# Patient Record
Sex: MALE | Race: WHITE | NOT HISPANIC OR LATINO | Employment: UNEMPLOYED | URBAN - METROPOLITAN AREA
[De-identification: names, ages, dates, MRNs, and addresses within clinical notes are randomized per-mention and may not be internally consistent; named-entity substitution may affect disease eponyms.]

---

## 2017-01-23 ENCOUNTER — OFFICE VISIT (OUTPATIENT)
Dept: FAMILY MEDICINE | Facility: CLINIC | Age: 37
End: 2017-01-23
Payer: COMMERCIAL

## 2017-01-23 VITALS
BODY MASS INDEX: 31.73 KG/M2 | TEMPERATURE: 97 F | HEIGHT: 74 IN | DIASTOLIC BLOOD PRESSURE: 80 MMHG | HEART RATE: 83 BPM | WEIGHT: 247.25 LBS | OXYGEN SATURATION: 98 % | SYSTOLIC BLOOD PRESSURE: 118 MMHG

## 2017-01-23 DIAGNOSIS — R07.9 CHEST PAIN, UNSPECIFIED TYPE: ICD-10-CM

## 2017-01-23 DIAGNOSIS — M54.9 UPPER BACK PAIN: Primary | ICD-10-CM

## 2017-01-23 DIAGNOSIS — M51.24 THORACIC DISC HERNIATION: ICD-10-CM

## 2017-01-23 PROCEDURE — 1159F MED LIST DOCD IN RCRD: CPT | Mod: S$GLB,,, | Performed by: FAMILY MEDICINE

## 2017-01-23 PROCEDURE — 99213 OFFICE O/P EST LOW 20 MIN: CPT | Mod: S$GLB,,, | Performed by: FAMILY MEDICINE

## 2017-01-23 PROCEDURE — 99999 PR PBB SHADOW E&M-EST. PATIENT-LVL III: CPT | Mod: PBBFAC,,, | Performed by: FAMILY MEDICINE

## 2017-01-23 PROCEDURE — 93010 ELECTROCARDIOGRAM REPORT: CPT | Mod: S$GLB,,, | Performed by: INTERNAL MEDICINE

## 2017-01-23 PROCEDURE — 93005 ELECTROCARDIOGRAM TRACING: CPT | Mod: S$GLB,,, | Performed by: FAMILY MEDICINE

## 2017-01-23 PROCEDURE — 3074F SYST BP LT 130 MM HG: CPT | Mod: S$GLB,,, | Performed by: FAMILY MEDICINE

## 2017-01-23 PROCEDURE — 3079F DIAST BP 80-89 MM HG: CPT | Mod: S$GLB,,, | Performed by: FAMILY MEDICINE

## 2017-01-23 NOTE — MR AVS SNAPSHOT
[facility]  [address] [address] [address]  Phone: [phone]  Fax: [phone]                  Kofi Damian   2017 9:20 AM   Office Visit    Description:  Male : 1980   Provider:  Óscar Kelly MD   Department:  [facility]           Diagnoses this Visit        Comments    Upper back pain    -  Primary     Thoracic disc herniation         Chest pain, unspecified type                To Do List           Future Appointments        Provider Department Dept Phone    2017 1:15 PM Jesu Quiñonez MD [facility] Neurosurgery [phone]      Goals (5 Years of Data)     None      [facility] On Call     [facility] On Call Nurse Care Line -  Assistance  Registered nurses in the [facility] On Call Center provide clinical advisement, health education, appointment booking, and other advisory services.  Call for this free service at [phone].             Medications           Message regarding Medications     Verify the changes and/or additions to your medication regime listed below are the same as discussed with your clinician today.  If any of these changes or additions are incorrect, please notify your healthcare provider.             Verify that the below list of medications is an accurate representation of the medications you are currently taking.  If none reported, the list may be blank. If incorrect, please contact your healthcare provider. Carry this list with you in case of emergency.           Current Medications     acetaminophen (TYLENOL) 325 MG tablet Take 325 mg by mouth every 6 (six) hours as needed for Pain.    benazepril (LOTENSIN) 10 MG tablet Take 1 tablet (10 mg total) by mouth once daily.    cyclobenzaprine (FLEXERIL) 10 MG tablet Take 10 mg by mouth 3 (three) times daily as needed for Muscle spasms.    gabapentin (NEURONTIN) 300 MG capsule Take 1 po q day x 1 week then 1 po bid x 1 week then 1 po tid    ibuprofen (ADVIL,MOTRIN)  "200 MG tablet Take 200 mg by mouth every 6 (six) hours as needed for Pain.    tramadol (ULTRAM) 50 mg tablet Take 2 tablets (100 mg total) by mouth every 6 (six) hours as needed for Pain.           Clinical Reference Information           Vital Signs - Last Recorded  Most recent update: 1/23/2017 10:01 AM by Óscar Kelly MD    BP Pulse Temp Ht Wt SpO2    118/80 83 96.9 °F (36.1 °C) (Oral) 6' 2" (1.88 m) 112.1 kg (247 lb 3.9 oz) 98%    BMI                31.74 kg/m2          Blood Pressure          Most Recent Value    BP  118/80      Allergies as of 1/23/2017     Cymbalta [Duloxetine]    Demerol [Meperidine]      Immunizations Administered on Date of Encounter - 1/23/2017     None      Orders Placed During Today's Visit      Normal Orders This Visit    Ambulatory referral to Neurosurgery     Future Labs/Procedures Expected by Expires    EKG 12-LEAD - Muse  As directed 1/23/2018    Exercise stress echo  As directed 1/23/2018      "

## 2017-01-23 NOTE — PROGRESS NOTES
"Subjective:      Patient ID: Kofi Damian is a 36 y.o. male.    Chief Complaint: upper back pain    HPI  He has had continued pain in the back and he has had his chest pain and it has gone all of the way up to the "hendrickson apple" at the same time.  He states that his bp and pulse were normal when this was going on.  It came on when he was sitting watching TV.  He states that the pain starts in the middle of the back and then it radiates around.  It is bilaterally located.    He and I had planned on him going to a cardiologist on his last visit but he states that he has not done that as he had lost insurance and he just got it back.  He personally would also like to have a second opinion on the trouble on his back. He did see a neurosurgeon for this and he did not feel that he had a surgical problem.   He continues to have low back pain also and he would like a second opinion on that also.       Narrative      At the T7-8 level, there is a focal 4 mm left posterior paracentral disc extrusion causing no spinal cord impingement or foraminal compromise.    The other thoracic spine discs demonstrate no abnormalities with no evidence of significant degeneration protrusion, or extrusion.  No osseous spinal canal or foraminal stenosis are present at any level.  The spinal cord appears normal.  Vertebral body heights are well maintained and the vertebral bodies are well aligned.  Bone marrow signal is normal.  The paraspinal soft tissues are unremarkable.       Impression       T7-8 left posterior paracentral disc extrusion causing no spinal cord impingement or foraminal compromise.      Electronically signed by: Lino Whalen  Date: 10/26/16  Time: 15:59         Encounter      View Encounter            Narrative      Comparison: L-spine series 03/12/14    Usual sequences performed without contrast.    Findings:    Vertebral body height and alignment are well maintained without acute fracture or subluxation.  Marrow " signal is within normal limits.  Minimal disc desiccation most prominently involving the T12 -- L1 and L1 -- 2 levels.  Schmorl's nodes identified at   the L1 -- 2 level.  Small Tarlov cyst noted within the posterior lateral margin of the sacral canal at the S1 -- 2 level minimally displacing adjacent nerve roots.  Correlate clinically.  Conus terminates at the T12 level.    T12 -- L1: Desiccation without herniation, protrusion or extrusion.  Mild facet arthropathy.  No acquired stenosis.     L1 -- 2: Posterior degenerative disc ridging with minimal foraminal and extraforaminal prominence.  No herniation or protrusion.  No acquired stenosis.  Bilateral facet arthropathy and minimally hypertrophied posterior ligaments.    L2 -- 3: Posterior concentric degenerative disc bulge or ridging.  Flattened anterior margin thecal sac.  Bilateral facet arthropathy and hypertrophy posterior ligaments with mild narrowing inferior aspect neural foramina , greater on the left.  No   central stenosis.    L3 -- 4: Posterior concentric disc bulge with flattened anterior margin thecal sac.  Inferior foraminal narrowing bilaterally with bilateral facet arthropathy and hypertrophy posterior ligaments.  No central stenosis.  6 mm x 6 mm right perineural cyst.        L4-5: Broad-based posterior concentric disc bulge.  Effaced anterior thecal sac.  Mild to moderate narrowing inferior aspect neural foramina bilaterally.  No central stenosis.  Mild facet arthropathy and hypertrophy of posterior ligaments.    L5 -- S1: Posterior concentric disc bulge.  Bilateral facet arthropathy.  No acquired stenosis.       Impression            Multilevel degenerative disc and facet disease.  See detailed discussion above.    Additional findings as above.      Electronically signed by: KIMMIE LESLIE III, MD  Date: 06/26/14  Time: 18:49        The patient presents with essential hypertension.  The patient is tolerating the medication well and is in  "excellent compliance.    Visit Vitals    /81    Pulse 83    Temp 96.9 °F (36.1 °C) (Oral)    Ht 6' 2" (1.88 m)    Wt 112.1 kg (247 lb 3.9 oz)    SpO2 98%    BMI 31.74 kg/m2       Review of Systems    Objective:     Visit Vitals    /81    Pulse 83    Temp 96.9 °F (36.1 °C) (Oral)    Ht 6' 2" (1.88 m)    Wt 112.1 kg (247 lb 3.9 oz)    SpO2 98%    BMI 31.74 kg/m2       Physical Exam   Constitutional: He appears well-developed and well-nourished.   Musculoskeletal:        Back:        Assessment:     1. Upper back pain    2. Thoracic disc herniation    3. Chest pain, unspecified type        Plan:   Diagnoses and all orders for this visit:    Upper back pain  -     Ambulatory referral to Neurosurgery    Thoracic disc herniation  -     Ambulatory referral to Neurosurgery    Chest pain, unspecified type  -     Exercise stress echo; Future  -     EKG 12-LEAD - Muse; Future      I am referring for a second opinion on the back pain that is constant but intermittently causes pain that radiates to his chest.  I am concerned that he is having intermittent impingement causing him some symptoms in his back and chest.  Due to his family history of early CAD, I am going to do the stress ECHO.  "

## 2017-01-26 ENCOUNTER — CLINICAL SUPPORT (OUTPATIENT)
Dept: CARDIOLOGY | Facility: CLINIC | Age: 37
End: 2017-01-26
Payer: COMMERCIAL

## 2017-01-26 DIAGNOSIS — R07.9 CHEST PAIN, UNSPECIFIED TYPE: ICD-10-CM

## 2017-01-26 LAB
DIASTOLIC DYSFUNCTION: NO
ESTIMATED PA SYSTOLIC PRESSURE: 21.66
MITRAL VALVE MOBILITY: NORMAL
RETIRED EF AND QEF - SEE NOTES: 55 (ref 55–65)

## 2017-01-26 PROCEDURE — 93321 DOPPLER ECHO F-UP/LMTD STD: CPT | Mod: S$GLB,,, | Performed by: INTERNAL MEDICINE

## 2017-01-26 PROCEDURE — 93351 STRESS TTE COMPLETE: CPT | Mod: S$GLB,,, | Performed by: INTERNAL MEDICINE

## 2017-04-12 ENCOUNTER — INITIAL CONSULT (OUTPATIENT)
Dept: NEUROSURGERY | Facility: CLINIC | Age: 37
End: 2017-04-12
Payer: COMMERCIAL

## 2017-04-12 VITALS
DIASTOLIC BLOOD PRESSURE: 83 MMHG | HEIGHT: 74 IN | SYSTOLIC BLOOD PRESSURE: 132 MMHG | HEART RATE: 81 BPM | WEIGHT: 237.44 LBS | BODY MASS INDEX: 30.47 KG/M2

## 2017-04-12 DIAGNOSIS — M51.24 THORACIC DISC HERNIATION: Primary | ICD-10-CM

## 2017-04-12 PROCEDURE — 99999 PR PBB SHADOW E&M-EST. PATIENT-LVL III: CPT | Mod: PBBFAC,,, | Performed by: NEUROLOGICAL SURGERY

## 2017-04-12 PROCEDURE — 99214 OFFICE O/P EST MOD 30 MIN: CPT | Mod: S$GLB,,, | Performed by: NEUROLOGICAL SURGERY

## 2017-04-12 PROCEDURE — 3079F DIAST BP 80-89 MM HG: CPT | Mod: S$GLB,,, | Performed by: NEUROLOGICAL SURGERY

## 2017-04-12 PROCEDURE — 3075F SYST BP GE 130 - 139MM HG: CPT | Mod: S$GLB,,, | Performed by: NEUROLOGICAL SURGERY

## 2017-04-12 PROCEDURE — 1160F RVW MEDS BY RX/DR IN RCRD: CPT | Mod: S$GLB,,, | Performed by: NEUROLOGICAL SURGERY

## 2017-04-12 NOTE — LETTER
April 24, 2017      Óscar Kelly MD  [address] [address] [address]           [facility]  [address]  [facility] [address]  Phone: [phone]  Fax: [phone]          Patient: Kofi Damian   MR Number: [id number]   YOB: 1980   Date of Visit: 4/12/2017       Dear Dr. Óscar Kelly:    Thank you for referring Kofi Damian to me for evaluation. Attached you will find relevant portions of my assessment and plan of care.    If you have questions, please do not hesitate to call me. I look forward to following Kofi Damian along with you.    Sincerely,    Jesu Quiñonez MD    Enclosure  CC:  No Recipients    If you would like to receive this communication electronically, please contact [email or web address] or [phone] to request more information on "[employer] Link access.    For providers and/or their staff who would like to refer a patient to [facility], please contact us through our one-stop-shop provider referral line, [facility] , at [phone].    If you feel you have received this communication in error or would no longer like to receive these types of communications, please e-mail [email or web address]

## 2017-04-12 NOTE — PROGRESS NOTES
Subjective:    I, Lyndon Ruiz, am scribing for, and in the presence of, Dr. Quiñonez.     Patient ID: Kofi Damian is a 36 y.o. male.    Chief Complaint: Consult and Lumbar Spine Pain (L-Spine)    HPI   Pt is a 36 y.o. male is here to see me for another opinion after being seen by Dr. Garrett. Pt presents complaining of thoracic back pain which has progressively worsened over the past several years. He has thoracic back pain some associated left-sided chest pain and sternal pain. He denies any radiation around the ribs on the left. He notes cardiac workup so far including stress test and echo which have been negative. He says gabapentin has not been providing any significant relief for his back pain. He says he has been unable to return to work since 2014, previously a , because he has been unable to remain standing for longer than 5 minutes due to pain. He says that he does have Tramadol but only takes this PRN.    Review of Systems   Constitutional: Negative for chills, diaphoresis, fatigue and fever.   HENT: Negative for congestion, rhinorrhea, sinus pressure, sneezing, sore throat and trouble swallowing.    Eyes: Negative.  Negative for visual disturbance.   Respiratory: Negative for cough, choking, chest tightness and shortness of breath.    Cardiovascular: Negative for chest pain.   Gastrointestinal: Negative for abdominal pain, diarrhea, nausea and vomiting.   Endocrine: Negative.    Genitourinary: Negative for dysuria.   Musculoskeletal: Positive for back pain (thoracic back pain, radiates through to left chest).   Skin: Negative for color change, pallor, rash and wound.   Neurological: Negative for syncope.   Hematological: Does not bruise/bleed easily.   Psychiatric/Behavioral: Negative for confusion.       Objective:      Physical Exam:    Constitutional: He appears well-developed.     Eyes: Pupils are equal, round, and reactive to light. Conjunctivae and EOM are normal.     Cardiovascular:  Normal rate, regular rhythm, normal pulses and intact distal pulses.     Abdominal: Soft.     Psych/Behavior: He is alert. He is oriented to person, place, and time. He has a normal mood and affect.     Musculoskeletal: Gait is normal.        Neck: Range of motion is full. There is no tenderness. Muscle strength is 5/5. Tone is normal.        Back: Range of motion is full. There is no tenderness. Muscle strength is 5/5. Tone is normal.        Right Upper Extremities: Range of motion is full. There is no tenderness. Muscle strength is 5/5. Tone is normal.        Left Upper Extremities: Range of motion is full. There is no tenderness. Muscle strength is 5/5. Tone is normal.       Right Lower Extremities: Range of motion is full. There is no tenderness. Muscle strength is 5/5. Tone is normal.        Left Lower Extremities: Range of motion is full. There is no tenderness. Muscle strength is 5/5. Tone is normal.     Neurological:        Coordination: He has a normal Romberg Test, normal finger to nose coordination, normal heel to shin coordination and normal tandem walking coordination.        Sensory: There is no sensory deficit in the trunk. There is no sensory deficit in the extremities.        DTRs: DTRs are normal. Tricep reflexes are 2+ on the right side and 2+ on the left side. Bicep reflexes are 2+ on the right side and 2+ on the left side. Brachioradialis reflexes are 2+ on the right side and 2+ on the left side. Patellar reflexes are 2+ on the right side and 2+ on the left side. Achilles reflexes are 2+ on the right side and 2+ on the left side. He displays no Babinski's sign on the right side. He displays no Babinski's sign on the left side.        Cranial nerves: Cranial nerve(s) II, III, IV, V, VI, VII, VIII, IX, X, XI and XII are intact.       Pt has midthoracic back pain with some associated left sided chest and sternal pan.   But he actually denies radiation around the ribs.   He is full strength.   No  myelopathy at this stage.   No bowel/bladder issues.   Relatively normal gait.   He has focal tenderness around the upper mid thoracic area.     Imaging:   MRI T-spine, dated 10/26/2016. He appears to have a left-sided T7-T8 paracentral disc herniation without compression on the thecal sac, but there is definitely compression on the nerve root. I don't see any central stenosis     MRI L-spine, dated June 2014, which was really not very impressive except for possible left-sided S2 or S3 perineural cyst.     Assessment/Plan:   36 y.o. male with left paracentral T7-T8 disc protrusion. There is mild canal compromise but definite nerve root impingement. He has had extensive cardiac workup for chest issues and chest wall issues that has been negative, so I believe this my be related somehow. It may just be an atypical C7 radiculopathy. I would like to try a transforaminal selective nerve root block on the left side. Since the last scan was about 6 months ago, I would like to get updated MRI scan now and CT scan to find out if it he has calcified or soft disc.     I, Dr. Quiñonez, personally performed the services described in this documentation as scribed by Lyndon Ruiz in my presence, and it is both accurate and complete.

## 2017-05-15 ENCOUNTER — PATIENT MESSAGE (OUTPATIENT)
Dept: NEUROSURGERY | Facility: CLINIC | Age: 37
End: 2017-05-15

## 2017-05-31 ENCOUNTER — TELEPHONE (OUTPATIENT)
Dept: NEUROSURGERY | Facility: CLINIC | Age: 37
End: 2017-05-31

## 2017-05-31 ENCOUNTER — TELEPHONE (OUTPATIENT)
Dept: INTERVENTIONAL RADIOLOGY/VASCULAR | Facility: HOSPITAL | Age: 37
End: 2017-05-31

## 2017-05-31 NOTE — TELEPHONE ENCOUNTER
---- Message -----  From: Kirti Nathan  Sent: 5/31/2017   3:16 PM  To: Moraima Pedraza MA  Subject: RE: Insurance will not cover                     Yes the previous note was an old note.    ----- Message -----  From: Moraima Pedraza MA  Sent: 5/30/2017   5:16 PM  To: Kirti Nathan  Subject: RE: Insurance will not cover                     Injection is showing approved is that correct?    Thanks        ----- Message -----  From: Kirti Nathan  Sent: 5/23/2017   9:27 AM  To: [facility]  Subject: Insurance will not cover                         Please be advised this procedure is not a covered benefit for patient. The insurance company will not pay for this procedure. Case is denied.    Kirti Nathan  Ext [phone]

## 2017-06-01 ENCOUNTER — HOSPITAL ENCOUNTER (OUTPATIENT)
Dept: INTERVENTIONAL RADIOLOGY/VASCULAR | Facility: HOSPITAL | Age: 37
Discharge: HOME | End: 2017-06-01
Attending: NEUROLOGICAL SURGERY
Payer: COMMERCIAL

## 2017-06-01 VITALS
DIASTOLIC BLOOD PRESSURE: 84 MMHG | HEART RATE: 72 BPM | OXYGEN SATURATION: 99 % | RESPIRATION RATE: 18 BRPM | SYSTOLIC BLOOD PRESSURE: 132 MMHG

## 2017-06-01 DIAGNOSIS — M51.24 THORACIC DISC HERNIATION: ICD-10-CM

## 2017-06-01 PROCEDURE — 64479 NJX AA&/STRD TFRM EPI C/T 1: CPT | Mod: ,,, | Performed by: RADIOLOGY

## 2017-06-01 PROCEDURE — 64480 NJX AA&/STRD TFRM EPI C/T EA: CPT | Mod: ,,, | Performed by: RADIOLOGY

## 2017-06-01 PROCEDURE — 64480 NJX AA&/STRD TFRM EPI C/T EA: CPT

## 2017-06-01 PROCEDURE — 63600175 PHARM REV CODE 636 W HCPCS: Performed by: NEUROLOGICAL SURGERY

## 2017-06-01 PROCEDURE — 64462 PVB THORACIC 2ND+ INJ SITE: CPT

## 2017-06-01 PROCEDURE — 25500020 PHARM REV CODE 255: Performed by: NEUROLOGICAL SURGERY

## 2017-06-01 PROCEDURE — 64461 PVB THORACIC SINGLE INJ SITE: CPT

## 2017-06-01 RX ORDER — METHYLPREDNISOLONE ACETATE 40 MG/ML
40 INJECTION, SUSPENSION INTRA-ARTICULAR; INTRALESIONAL; INTRAMUSCULAR; SOFT TISSUE ONCE
Status: COMPLETED | OUTPATIENT
Start: 2017-06-01 | End: 2017-06-01

## 2017-06-01 RX ADMIN — METHYLPREDNISOLONE ACETATE 40 MG: 40 INJECTION, SUSPENSION INTRA-ARTICULAR; INTRALESIONAL; INTRAMUSCULAR; SOFT TISSUE at 10:06

## 2017-06-01 RX ADMIN — METHYLPREDNISOLONE ACETATE 40 MG: 40 INJECTION, SUSPENSION INTRA-ARTICULAR; INTRALESIONAL; INTRAMUSCULAR; SOFT TISSUE at 11:06

## 2017-06-01 RX ADMIN — IOHEXOL 3 ML: 180 INJECTION INTRAVENOUS at 11:06

## 2017-06-01 NOTE — H&P
Radiology History & Physical      SUBJECTIVE:     Chief Complaint: back pain    History of Present Illness:  Kofi Damian is a 36 y.o. male who presents for nerve root blocks at T6, T7, and T8 on the left.    Past Medical History:   Diagnosis Date    Back pain     Facet arthropathy, lumbosacral     Family history of early CAD 8/10/2016    His father  at the age of 48 from an MI    L4-L5 disc bulge      Past Surgical History:   Procedure Laterality Date    COLONOSCOPY N/A 2016    Procedure: COLONOSCOPY;  Surgeon: Óscar Kelly MD;  Location: [facility];  Service: Endoscopy;  Laterality: N/A;    LIPOMA RESECTION      WRIST SURGERY         Home Meds:   Prior to Admission medications    Medication Sig Start Date End Date Taking? Authorizing Provider   acetaminophen (TYLENOL) 325 MG tablet Take 325 mg by mouth every 6 (six) hours as needed for Pain.    Historical Provider, MD   benazepril (LOTENSIN) 10 MG tablet Take 1 tablet (10 mg total) by mouth once daily. 16   Óscar Kelly MD   cyclobenzaprine (FLEXERIL) 10 MG tablet Take 10 mg by mouth 3 (three) times daily as needed for Muscle spasms.    Historical Provider, MD   gabapentin (NEURONTIN) 300 MG capsule Take 1 po q day x 1 week then 1 po bid x 1 week then 1 po tid 16   Óscar Kelly MD   ibuprofen (ADVIL,MOTRIN) 200 MG tablet Take 200 mg by mouth every 6 (six) hours as needed for Pain.    Historical Provider, MD   tramadol (ULTRAM) 50 mg tablet Take 2 tablets (100 mg total) by mouth every 6 (six) hours as needed for Pain. 10/11/16   Óscar Kelly MD     Anticoagulants/Antiplatelets: no anticoagulation    Allergies:   Review of patient's allergies indicates:   Allergen Reactions    Cymbalta [duloxetine]      Made him shake, not sleep and have suicidal thoughts.    Demerol [meperidine]      Sedation History:  no adverse reactions    Review of Systems:   Hematological: no known coagulopathies  Respiratory: no shortness of  breath  Cardiovascular: no chest pain  Gastrointestinal: no abdominal pain  Genito-Urinary: no dysuria  Musculoskeletal: back pain  Neurological: no TIA or stroke symptoms         OBJECTIVE:     Vital Signs (Most Recent)  Pulse: 72 (06/01/17 1017)  Resp: 18 (06/01/17 1017)  BP: 132/84 (06/01/17 1017)  SpO2: 99 % (06/01/17 1017)    Physical Exam:  ASA: 3  Mallampati: 2    General: no acute distress  Mental Status: alert and oriented to person, place and time  HEENT: normocephalic, atraumatic  Chest: unlabored breathing  Heart: regular heart rate  Abdomen: nondistended  Extremity: moves all extremities    Laboratory  Lab Results   Component Value Date    INR 1.0 10/07/2016       Lab Results   Component Value Date    WBC 12.55 10/07/2016    HGB 16.3 10/07/2016    HCT 48.1 10/07/2016    MCV 88 10/07/2016     10/07/2016      Lab Results   Component Value Date    GLU 93 10/17/2016     10/17/2016    K 4.1 10/17/2016     10/17/2016    CO2 20 (L) 10/17/2016    BUN 15 10/17/2016    CREATININE 0.9 10/17/2016    CALCIUM 9.7 10/17/2016    ALT 47 (H) 10/17/2016    AST 21 10/17/2016    ALBUMIN 4.2 10/17/2016    BILITOT 0.7 10/17/2016       ASSESSMENT/PLAN:     Sedation Plan: local to moderate, PRN  Patient will undergo left T6, T7, and T8 nerve root blocks.    Ricky Aj MD  Department of Radiology  [phone]

## 2017-06-01 NOTE — PROCEDURES
Radiology Post-Procedure Note    Pre Op Diagnosis: back pain    Post Op Diagnosis: Same    Procedure: Thoracic nerve root blocks    Procedure performed by: Dr. Wooten, Dr. Aj    Written Informed Consent Obtained: Yes    Specimen Removed: NO    Estimated Blood Loss: Minimal    Levels injected: left T6, T7, and T8 nerve root blocks  Needle used: 22 gauge  Omnipaque injections confirmed location  Dose to each level:    40 mg Depo-methylprednisolone   1.5 mL Lidocaine 1% MPF    Patient tolerated procedure well.    Ricky Aj MD  Department of Radiology  [phone]

## 2017-06-01 NOTE — PROGRESS NOTES
Pt arrived to IR room 188 for nerve root block, no acute distress noted. Orders and labs reviewed on chart. Awaiting consent.

## 2017-06-01 NOTE — DISCHARGE INSTRUCTIONS
[facility] [phone] (MON-FRI 8 AM- 5PM). Radiology Resident on call [phone].

## 2017-06-01 NOTE — PROGRESS NOTES
Nerve root blocks at T6, T7, and T8 on the left completed, pt tolerated well. No apparent distress noted. Band aid applied CDI. Discharge instructions reviewed and acknowledged. Pt discharged via wheelchair and private vehicle, accompanied by family.

## 2017-06-15 ENCOUNTER — TELEPHONE (OUTPATIENT)
Dept: NEUROSURGERY | Facility: CLINIC | Age: 37
End: 2017-06-15

## 2017-06-15 NOTE — TELEPHONE ENCOUNTER
The pt stated that his insurance has not yet approved his MRI and Ct request from  therefore the pt would rather reschedule these appointments until given approval from insurance.  I verbalized understanding and will also reschedule appointment.  I also informed the pt that a peer to peer has been set for 6/20/17 for possible approval.

## 2017-06-15 NOTE — TELEPHONE ENCOUNTER
----- Message from Keyanna Ann sent at 6/15/2017  2:52 PM CDT -----  Contact: pt [phone]  Pt is calling to speak with nurse regarding his appts on 6/20,pt states he is having some issues with his insurance company.pls call

## 2017-07-05 ENCOUNTER — TELEPHONE (OUTPATIENT)
Dept: SPINE | Facility: CLINIC | Age: 37
End: 2017-07-05

## 2017-07-05 NOTE — TELEPHONE ENCOUNTER
----- Message from Gustavo Sykes sent at 7/5/2017  4:06 PM CDT -----  Contact: PT  Would like Moraima to call him regarding MRI and CT. Would like to know if it has been approved by his insurance.     Please call patient as soon as possible.     Call: [phone]

## 2017-07-07 ENCOUNTER — TELEPHONE (OUTPATIENT)
Dept: NEUROSURGERY | Facility: CLINIC | Age: 37
End: 2017-07-07

## 2017-07-07 NOTE — TELEPHONE ENCOUNTER
----- Message from Gustavo Sykes sent at 7/5/2017  4:06 PM CDT -----  Contact: PT  Would like Moraima to call him regarding MRI and CT. Would like to know if it has been approved by his insurance.     Please call patient as soon as possible.     Call: [phone]

## 2017-07-11 ENCOUNTER — OFFICE VISIT (OUTPATIENT)
Dept: NEUROSURGERY | Facility: CLINIC | Age: 37
End: 2017-07-11
Payer: COMMERCIAL

## 2017-07-11 ENCOUNTER — HOSPITAL ENCOUNTER (OUTPATIENT)
Dept: RADIOLOGY | Facility: HOSPITAL | Age: 37
Discharge: HOME | End: 2017-07-11
Attending: NEUROLOGICAL SURGERY
Payer: COMMERCIAL

## 2017-07-11 VITALS
DIASTOLIC BLOOD PRESSURE: 77 MMHG | HEART RATE: 83 BPM | HEIGHT: 74 IN | SYSTOLIC BLOOD PRESSURE: 124 MMHG | WEIGHT: 231.5 LBS | TEMPERATURE: 98 F | BODY MASS INDEX: 29.71 KG/M2

## 2017-07-11 DIAGNOSIS — M51.24 HERNIATED THORACIC DISC WITHOUT MYELOPATHY: Primary | ICD-10-CM

## 2017-07-11 DIAGNOSIS — M51.24 THORACIC DISC HERNIATION: ICD-10-CM

## 2017-07-11 PROCEDURE — 99214 OFFICE O/P EST MOD 30 MIN: CPT | Mod: S$GLB,,, | Performed by: NEUROLOGICAL SURGERY

## 2017-07-11 PROCEDURE — 72146 MRI CHEST SPINE W/O DYE: CPT | Mod: TC

## 2017-07-11 PROCEDURE — 99999 PR PBB SHADOW E&M-EST. PATIENT-LVL III: CPT | Mod: PBBFAC,,, | Performed by: NEUROLOGICAL SURGERY

## 2017-07-11 PROCEDURE — 72146 MRI CHEST SPINE W/O DYE: CPT | Mod: 26,,, | Performed by: RADIOLOGY

## 2017-07-11 NOTE — LETTER
July 18, 2017      Óscar Kelly MD  [address] [address] [address]           [facility] - Neurosurgery 7th Fl  [address]  [facility] [address]  Phone: [phone]          Patient: Kofi Damian   MR Number: [id number]   YOB: 1980   Date of Visit: 7/11/2017       Dear Dr. Óscar Kelly:    Thank you for referring Kofi Damian to me for evaluation. Attached you will find relevant portions of my assessment and plan of care.    If you have questions, please do not hesitate to call me. I look forward to following Kofi Damian along with you.    Sincerely,    Kel Blackman  CC:  No Recipients    If you would like to receive this communication electronically, please contact [email or web address] or [phone] to request more information on [employer] access.    For providers and/or their staff who would like to refer a patient to [facility], please contact us through our one-stop-shop provider referral line, [facility], at [phone].    If you feel you have received this communication in error or would no longer like to receive these types of communications, please e-mail [email or web address]

## 2017-07-11 NOTE — PROGRESS NOTES
"Subjective:    I, Lyndon Ruiz, am scribing for, and in the presence of, Dr. Quiñonez     Patient ID: Kofi Damian is a 36 y.o. male.    Chief Complaint: Follow-up    HPI   Pt is a 36 y.o. male who presents for follow up after last evaluation on 4/12/2017. Pt has a left paracentral T7-T8 disc protrusion and thoracic radiculopathy. At our last visit, we weren't quite sure what to make of it. He is here for follow up with a new MRI scan. Pt continues to complain of mid-thoracic back pain that feels like "someone stabbing me in the back and with the knife coming out of my chest" at the sternum area. He still complains of paraspinal back pain greater on the right than left. He denies any rib pain. He states that transforaminal selective nerve root block injections done in June were not helpful. He denies any leg weakness or bowel/bladder issues.      Review of Systems   Constitutional: Negative for chills, diaphoresis, fatigue and fever.   HENT: Negative for congestion, rhinorrhea, sinus pressure, sneezing, sore throat and trouble swallowing.    Eyes: Negative.  Negative for visual disturbance.   Respiratory: Negative for cough, choking, chest tightness and shortness of breath.    Cardiovascular: Negative for chest pain.   Gastrointestinal: Negative for abdominal pain, diarrhea, nausea and vomiting.   Endocrine: Negative.    Genitourinary: Negative for dysuria.   Musculoskeletal: Positive for back pain (mid thoracic back pain radiates through to the sternum. paraspinal thoracic back pain greater on right than left.).   Skin: Negative for color change, pallor, rash and wound.   Neurological: Negative for syncope.   Hematological: Does not bruise/bleed easily.   Psychiatric/Behavioral: Negative for confusion.       Objective:      Physical Exam:    Constitutional: He appears well-developed.     Eyes: Pupils are equal, round, and reactive to light. Conjunctivae and EOM are normal.     Cardiovascular: Normal rate, regular " rhythm, normal pulses and intact distal pulses.     Abdominal: Soft.     Psych/Behavior: He is alert. He is oriented to person, place, and time. He has a normal mood and affect.     Musculoskeletal: Gait is normal.        Neck: Range of motion is full. There is no tenderness. Muscle strength is 5/5. Tone is normal.        Back: Range of motion is full. There is no tenderness. Muscle strength is 5/5. Tone is normal.        Right Upper Extremities: Range of motion is full. There is no tenderness. Muscle strength is 5/5. Tone is normal.        Left Upper Extremities: Range of motion is full. There is no tenderness. Muscle strength is 5/5. Tone is normal.       Right Lower Extremities: Range of motion is full. There is no tenderness. Muscle strength is 5/5. Tone is normal.        Left Lower Extremities: Range of motion is full. There is no tenderness. Muscle strength is 5/5. Tone is normal.     Neurological:        Coordination: He has a normal Romberg Test, normal finger to nose coordination, normal heel to shin coordination and normal tandem walking coordination.        Sensory: There is no sensory deficit in the trunk. There is no sensory deficit in the extremities.        DTRs: DTRs are normal. Tricep reflexes are 2+ on the right side and 2+ on the left side. Bicep reflexes are 2+ on the right side and 2+ on the left side. Brachioradialis reflexes are 2+ on the right side and 2+ on the left side. Patellar reflexes are 2+ on the right side and 2+ on the left side. Achilles reflexes are 2+ on the right side and 2+ on the left side. He displays no Babinski's sign on the right side. He displays no Babinski's sign on the left side.        Cranial nerves: Cranial nerve(s) II, III, IV, V, VI, VII, VIII, IX, X, XI and XII are intact.       Pt still has significant amount of mid thoracic back pain but it is right paraspinal. He still denies any true radiating pain around the ribs, but has pain anteriorly in the sternum area.  He states that it is best described as someone stabbing him in the back with a knife coming out from his chest.    No real signs of myelopathy.   No real signs of bowel or bladder dysfunction or weakness in the legs.      Imaging:   MRI T-spine, dated 07/11/2017, shows pt has stable thoracic disc at T7-T8.     Assessment/Plan:   Pt with a left T7-T8 thoracic disc and possible atypical thoracic radiculopathy. No evidence of myelopathy. I think before jumping to thoracic discectomy, I would like to get another opinion from one of my spine colleagues.     I, Dr. Quiñonez, personally performed the services described in this documentation as scribed by Lyndon Ruiz in my presence, and it is both accurate and complete.

## 2017-12-20 ENCOUNTER — LAB VISIT (OUTPATIENT)
Dept: LAB | Facility: HOSPITAL | Age: 37
End: 2017-12-20
Attending: FAMILY MEDICINE
Payer: COMMERCIAL

## 2017-12-20 ENCOUNTER — OFFICE VISIT (OUTPATIENT)
Dept: FAMILY MEDICINE | Facility: CLINIC | Age: 37
End: 2017-12-20
Payer: COMMERCIAL

## 2017-12-20 VITALS
HEIGHT: 74 IN | HEART RATE: 66 BPM | TEMPERATURE: 98 F | BODY MASS INDEX: 28.75 KG/M2 | WEIGHT: 224 LBS | DIASTOLIC BLOOD PRESSURE: 77 MMHG | SYSTOLIC BLOOD PRESSURE: 117 MMHG

## 2017-12-20 DIAGNOSIS — I10 ESSENTIAL HYPERTENSION: Primary | ICD-10-CM

## 2017-12-20 DIAGNOSIS — M51.24 THORACIC DISC HERNIATION: ICD-10-CM

## 2017-12-20 DIAGNOSIS — K75.81 NASH (NONALCOHOLIC STEATOHEPATITIS): ICD-10-CM

## 2017-12-20 DIAGNOSIS — E78.1 HYPERTRIGLYCERIDEMIA: ICD-10-CM

## 2017-12-20 DIAGNOSIS — I10 ESSENTIAL HYPERTENSION: ICD-10-CM

## 2017-12-20 DIAGNOSIS — G89.29 CHRONIC MIDLINE THORACIC BACK PAIN: ICD-10-CM

## 2017-12-20 DIAGNOSIS — M54.6 CHRONIC MIDLINE THORACIC BACK PAIN: ICD-10-CM

## 2017-12-20 DIAGNOSIS — R21 RASH: ICD-10-CM

## 2017-12-20 LAB
ALBUMIN SERPL BCP-MCNC: 4.1 G/DL
ALP SERPL-CCNC: 69 U/L
ALT SERPL W/O P-5'-P-CCNC: 32 U/L
ANION GAP SERPL CALC-SCNC: 9 MMOL/L
AST SERPL-CCNC: 16 U/L
BASOPHILS # BLD AUTO: 0.06 K/UL
BASOPHILS NFR BLD: 0.7 %
BILIRUB SERPL-MCNC: 0.8 MG/DL
BUN SERPL-MCNC: 11 MG/DL
CALCIUM SERPL-MCNC: 9.9 MG/DL
CHLORIDE SERPL-SCNC: 105 MMOL/L
CHOLEST SERPL-MCNC: 135 MG/DL
CHOLEST/HDLC SERPL: 4.5 {RATIO}
CO2 SERPL-SCNC: 26 MMOL/L
CREAT SERPL-MCNC: 1 MG/DL
DIFFERENTIAL METHOD: ABNORMAL
EOSINOPHIL # BLD AUTO: 0.5 K/UL
EOSINOPHIL NFR BLD: 5.2 %
ERYTHROCYTE [DISTWIDTH] IN BLOOD BY AUTOMATED COUNT: 12.2 %
EST. GFR  (AFRICAN AMERICAN): >60 ML/MIN/1.73 M^2
EST. GFR  (NON AFRICAN AMERICAN): >60 ML/MIN/1.73 M^2
GLUCOSE SERPL-MCNC: 96 MG/DL
HCT VFR BLD AUTO: 48.1 %
HDLC SERPL-MCNC: 30 MG/DL
HDLC SERPL: 22.2 %
HGB BLD-MCNC: 15.1 G/DL
IMM GRANULOCYTES # BLD AUTO: 0.03 K/UL
IMM GRANULOCYTES NFR BLD AUTO: 0.3 %
LDLC SERPL CALC-MCNC: 93.4 MG/DL
LYMPHOCYTES # BLD AUTO: 2.5 K/UL
LYMPHOCYTES NFR BLD: 27.5 %
MCH RBC QN AUTO: 27.6 PG
MCHC RBC AUTO-ENTMCNC: 31.4 G/DL
MCV RBC AUTO: 88 FL
MONOCYTES # BLD AUTO: 0.6 K/UL
MONOCYTES NFR BLD: 6.9 %
NEUTROPHILS # BLD AUTO: 5.4 K/UL
NEUTROPHILS NFR BLD: 59.4 %
NONHDLC SERPL-MCNC: 105 MG/DL
NRBC BLD-RTO: 0 /100 WBC
PLATELET # BLD AUTO: 278 K/UL
PMV BLD AUTO: 11.7 FL
POTASSIUM SERPL-SCNC: 5.3 MMOL/L
PROT SERPL-MCNC: 7.4 G/DL
RBC # BLD AUTO: 5.47 M/UL
SODIUM SERPL-SCNC: 140 MMOL/L
TRIGL SERPL-MCNC: 58 MG/DL
WBC # BLD AUTO: 9.05 K/UL

## 2017-12-20 PROCEDURE — 90715 TDAP VACCINE 7 YRS/> IM: CPT | Mod: S$GLB,,, | Performed by: FAMILY MEDICINE

## 2017-12-20 PROCEDURE — 36415 COLL VENOUS BLD VENIPUNCTURE: CPT | Mod: PO

## 2017-12-20 PROCEDURE — 99395 PREV VISIT EST AGE 18-39: CPT | Mod: 25,S$GLB,, | Performed by: FAMILY MEDICINE

## 2017-12-20 PROCEDURE — 85025 COMPLETE CBC W/AUTO DIFF WBC: CPT

## 2017-12-20 PROCEDURE — 80061 LIPID PANEL: CPT

## 2017-12-20 PROCEDURE — 80053 COMPREHEN METABOLIC PANEL: CPT

## 2017-12-20 PROCEDURE — 90471 IMMUNIZATION ADMIN: CPT | Mod: S$GLB,,, | Performed by: FAMILY MEDICINE

## 2017-12-20 PROCEDURE — 99999 PR PBB SHADOW E&M-EST. PATIENT-LVL IV: CPT | Mod: PBBFAC,,, | Performed by: FAMILY MEDICINE

## 2017-12-20 PROCEDURE — 90686 IIV4 VACC NO PRSV 0.5 ML IM: CPT | Mod: S$GLB,,, | Performed by: FAMILY MEDICINE

## 2017-12-20 PROCEDURE — 90472 IMMUNIZATION ADMIN EACH ADD: CPT | Mod: S$GLB,,, | Performed by: FAMILY MEDICINE

## 2017-12-20 RX ORDER — GABAPENTIN 300 MG/1
CAPSULE ORAL
Qty: 270 CAPSULE | Refills: 3 | Status: SHIPPED | OUTPATIENT
Start: 2017-12-20 | End: 2018-12-12

## 2017-12-20 RX ORDER — BENAZEPRIL HYDROCHLORIDE 10 MG/1
10 TABLET ORAL DAILY
Qty: 90 TABLET | Refills: 3 | Status: SHIPPED | OUTPATIENT
Start: 2017-12-20 | End: 2018-11-26 | Stop reason: SDUPTHER

## 2017-12-20 NOTE — PROGRESS NOTES
"Subjective:      Patient ID: Kofi Damian is a 37 y.o. male.    Chief Complaint: Annual Exam    HPI   Problem List Items Addressed This Visit     Essential hypertension    Overview     The patient presents with essential hypertension.  The patient is tolerating the medication well and is in excellent compliance.  The patient is experiencing no side effects.  Counseling was offered regarding low salt diets.  The patient has a reduced salt intake.  The patient denies chest pain, palpitations, shortness of breath, dyspnea on exertion, left or murmur neck pain, nausea, vomiting, diaphoresis, paroxysmal nocturnal dyspnea, and orthopnea.   /77   Pulse 66   Temp 98.2 °F (36.8 °C) (Oral)   Ht 6' 2" (1.88 m)   Wt 101.6 kg (223 lb 15.8 oz)   BMI 28.76 kg/m²   He is on lotensin for this now.         Current Assessment & Plan     The current medical regimen is effective;  continue present plan and medications.           Hypertriglyceridemia    Overview     The patient presents with hyperlipidemia.    He is not on medicine for this now.  Lab Results   Component Value Date    CHOL 203 (H) 08/10/2016       Lab Results   Component Value Date    HDL 33 (L) 08/10/2016       Lab Results   Component Value Date    LDLCALC 132.0 08/10/2016       Lab Results   Component Value Date    TRIG 190 (H) 08/10/2016       Lab Results   Component Value Date    CHOLHDL 16.3 (L) 08/10/2016     Lab Results   Component Value Date    ALT 47 (H) 10/17/2016    AST 21 10/17/2016    ALKPHOS 78 10/17/2016    BILITOT 0.7 10/17/2016              SOLORZANO (nonalcoholic steatohepatitis)    Overview     He has SOLORZANO and he has had liver functions done in the past.  The last numbers are below:  Lab Results   Component Value Date    ALT 47 (H) 10/17/2016    AST 21 10/17/2016    ALKPHOS 78 10/17/2016    BILITOT 0.7 10/17/2016              Thoracic disc herniation    Overview     He has a thoracic disc herniation and he has been treated medically.  He had " been evaluated by Dr. Quiñonez.  Dr. Quiñonez had wanted him to see another doctor for a second opinion and the patient never got an appointment time, he states.  At this point, he does not want to pursue surgery due to the down time that he does not want to endure and his pain is fairly well controlled with the gabapentin.           Current Assessment & Plan     Continuing current management.  Refer to a neurosurgeon for a second opinion when he is ready.             He also has a rash that has been on him for the last 6 months and he states that he had one that was a fungal infection on the right arm in the past and diflucan resolved it but now he had it on his chest and face and it itches, flakes and irritates.         Health Maintenance Due   Topic Date Due    TETANUS VACCINE  2000    Influenza Vaccine  2017       Past Medical History:  Past Medical History:   Diagnosis Date    Back pain     Facet arthropathy, lumbosacral     Family history of early CAD 8/10/2016    His father  at the age of 48 from an MI    L4-L5 disc bulge      Past Surgical History:   Procedure Laterality Date    COLONOSCOPY N/A 2016    Procedure: COLONOSCOPY;  Surgeon: Óscar Kelly MD;  Location[facility];  Service: Endoscopy;  Laterality: N/A;    LIPOMA RESECTION      WRIST SURGERY       Review of patient's allergies indicates:   Allergen Reactions    Cymbalta [duloxetine]      Made him shake, not sleep and have suicidal thoughts.    Demerol [meperidine]      Current Outpatient Prescriptions on File Prior to Visit   Medication Sig Dispense Refill    acetaminophen (TYLENOL) 325 MG tablet Take 325 mg by mouth every 6 (six) hours as needed for Pain.      benazepril (LOTENSIN) 10 MG tablet Take 1 tablet (10 mg total) by mouth once daily. 90 tablet 3    gabapentin (NEURONTIN) 300 MG capsule Take 1 po q day x 1 week then 1 po bid x 1 week then 1 po tid 90 capsule 11    ibuprofen (ADVIL,MOTRIN) 200 MG tablet Take 200  "mg by mouth every 6 (six) hours as needed for Pain.      [DISCONTINUED] cyclobenzaprine (FLEXERIL) 10 MG tablet Take 10 mg by mouth 3 (three) times daily as needed for Muscle spasms.      [DISCONTINUED] tramadol (ULTRAM) 50 mg tablet Take 2 tablets (100 mg total) by mouth every 6 (six) hours as needed for Pain. 120 tablet 0     No current facility-administered medications on file prior to visit.      Social History     Social History    Marital status:      Spouse name: N/A    Number of children: N/A    Years of education: N/A     Occupational History     [employer]     Social History Main Topics    Smoking status: Former Smoker     Packs/day: 1.00     Years: 15.00     Start date: 12/21/2013    Smokeless tobacco: Former User    Alcohol use Yes      Comment: stopped 04/2016    Drug use: No    Sexual activity: Yes     Partners: Female     Other Topics Concern    Not on file     Social History Narrative    No narrative on file     Family History   Problem Relation Age of Onset    Hypertension Mother     Heart disease Father              Review of Systems   Constitutional: Negative for fever and unexpected weight change.   HENT: Negative for sore throat.    Respiratory: Negative for shortness of breath.    Cardiovascular: Negative for chest pain and palpitations.   Gastrointestinal: Negative for abdominal pain and diarrhea.   Genitourinary: Negative for dysuria and hematuria.   Musculoskeletal: Negative for neck pain.   Skin: Positive for rash.   Neurological: Negative for headaches.       Objective:   /77   Pulse 66   Temp 98.2 °F (36.8 °C) (Oral)   Ht 6' 2" (1.88 m)   Wt 101.6 kg (223 lb 15.8 oz)   BMI 28.76 kg/m²     Physical Exam   Constitutional: He is oriented to person, place, and time. He appears well-developed and well-nourished.   HENT:   Head: Normocephalic and atraumatic.   Right Ear: External ear normal.   Left Ear: External ear normal.   Nose: Nose normal.   Mouth/Throat: " Oropharynx is clear and moist. No oropharyngeal exudate.   Eyes: Conjunctivae and EOM are normal. Pupils are equal, round, and reactive to light. Right eye exhibits no discharge. Left eye exhibits no discharge. No scleral icterus.   Neck: Normal range of motion. Neck supple. No JVD present. No thyromegaly present.   Cardiovascular: Normal rate, regular rhythm, normal heart sounds and intact distal pulses.  Exam reveals no gallop and no friction rub.    No murmur heard.  Pulmonary/Chest: Effort normal and breath sounds normal. No respiratory distress. He has no wheezes. He has no rales. He exhibits no tenderness.   Abdominal: Soft. Bowel sounds are normal. He exhibits no distension and no mass. There is no tenderness. There is no rebound and no guarding.   Musculoskeletal: Normal range of motion. He exhibits no edema or tenderness.   Lymphadenopathy:     He has no cervical adenopathy.   Neurological: He is alert and oriented to person, place, and time. No cranial nerve deficit. Coordination normal.   Skin: Skin is warm and dry. He is not diaphoretic.   Erythematous changes are noted on the face and chest and he has some scaling like seborrhea on the face but erythema with central clearing on the chest.  It is not clear cut.   Psychiatric: He has a normal mood and affect.       Assessment:     1. Essential hypertension    2. Thoracic disc herniation    3. Hypertriglyceridemia    4. SOLORZANO (nonalcoholic steatohepatitis)    5. Chronic midline thoracic back pain    6. Rash        Plan:   Kofi was seen today for annual exam.    Diagnoses and all orders for this visit:    Essential hypertension  -     benazepril (LOTENSIN) 10 MG tablet; Take 1 tablet (10 mg total) by mouth once daily.  -     CBC auto differential; Future  -     Comprehensive metabolic panel; Future    Thoracic disc herniation    Hypertriglyceridemia  -     Comprehensive metabolic panel; Future  -     Lipid panel; Future    SOLORZANO (nonalcoholic  steatohepatitis)    Chronic midline thoracic back pain  -     gabapentin (NEURONTIN) 300 MG capsule; Take 1 po tid    Rash  -     Ambulatory consult to Dermatology    Other orders  -     Influenza - Quadrivalent (3 years & older) (PF)  -     Tdap Vaccine

## 2017-12-21 NOTE — PROGRESS NOTES
The cmp is ok for him.    The lipids are showing a low HDL which can be affected by exercise so please increase this to help with the HDL (good cholesterol)  The CBC is normal.

## 2018-11-26 DIAGNOSIS — I10 ESSENTIAL HYPERTENSION: ICD-10-CM

## 2018-11-26 RX ORDER — BENAZEPRIL HYDROCHLORIDE 10 MG/1
10 TABLET ORAL DAILY
Qty: 90 TABLET | Refills: 3 | Status: SHIPPED | OUTPATIENT
Start: 2018-11-26 | End: 2018-12-12 | Stop reason: SDUPTHER

## 2018-12-12 ENCOUNTER — OFFICE VISIT (OUTPATIENT)
Dept: FAMILY MEDICINE | Facility: CLINIC | Age: 38
End: 2018-12-12
Payer: COMMERCIAL

## 2018-12-12 ENCOUNTER — LAB VISIT (OUTPATIENT)
Dept: LAB | Facility: HOSPITAL | Age: 38
End: 2018-12-12
Attending: FAMILY MEDICINE
Payer: COMMERCIAL

## 2018-12-12 VITALS
BODY MASS INDEX: 31.49 KG/M2 | TEMPERATURE: 98 F | HEIGHT: 74 IN | SYSTOLIC BLOOD PRESSURE: 114 MMHG | HEART RATE: 80 BPM | WEIGHT: 245.38 LBS | DIASTOLIC BLOOD PRESSURE: 78 MMHG

## 2018-12-12 DIAGNOSIS — G89.29 CHRONIC MIDLINE THORACIC BACK PAIN: ICD-10-CM

## 2018-12-12 DIAGNOSIS — I10 ESSENTIAL HYPERTENSION: ICD-10-CM

## 2018-12-12 DIAGNOSIS — Z00.00 ANNUAL PHYSICAL EXAM: Primary | ICD-10-CM

## 2018-12-12 DIAGNOSIS — M51.24 THORACIC DISC HERNIATION: ICD-10-CM

## 2018-12-12 DIAGNOSIS — M54.6 CHRONIC MIDLINE THORACIC BACK PAIN: ICD-10-CM

## 2018-12-12 LAB
ANION GAP SERPL CALC-SCNC: 8 MMOL/L
BUN SERPL-MCNC: 12 MG/DL
CALCIUM SERPL-MCNC: 9.4 MG/DL
CHLORIDE SERPL-SCNC: 108 MMOL/L
CO2 SERPL-SCNC: 24 MMOL/L
CREAT SERPL-MCNC: 0.8 MG/DL
EST. GFR  (AFRICAN AMERICAN): >60 ML/MIN/1.73 M^2
EST. GFR  (NON AFRICAN AMERICAN): >60 ML/MIN/1.73 M^2
GLUCOSE SERPL-MCNC: 88 MG/DL
POTASSIUM SERPL-SCNC: 4.3 MMOL/L
SODIUM SERPL-SCNC: 140 MMOL/L
TSH SERPL DL<=0.005 MIU/L-ACNC: 1.43 UIU/ML

## 2018-12-12 PROCEDURE — 90471 IMMUNIZATION ADMIN: CPT | Mod: S$GLB,,, | Performed by: FAMILY MEDICINE

## 2018-12-12 PROCEDURE — 99395 PREV VISIT EST AGE 18-39: CPT | Mod: 25,S$GLB,, | Performed by: FAMILY MEDICINE

## 2018-12-12 PROCEDURE — 99999 PR PBB SHADOW E&M-EST. PATIENT-LVL III: CPT | Mod: PBBFAC,,, | Performed by: FAMILY MEDICINE

## 2018-12-12 PROCEDURE — 90734 MENACWYD/MENACWYCRM VACC IM: CPT | Mod: S$GLB,,, | Performed by: FAMILY MEDICINE

## 2018-12-12 PROCEDURE — 90472 IMMUNIZATION ADMIN EACH ADD: CPT | Mod: S$GLB,,, | Performed by: FAMILY MEDICINE

## 2018-12-12 PROCEDURE — 84443 ASSAY THYROID STIM HORMONE: CPT

## 2018-12-12 PROCEDURE — 80048 BASIC METABOLIC PNL TOTAL CA: CPT

## 2018-12-12 PROCEDURE — 3074F SYST BP LT 130 MM HG: CPT | Mod: CPTII,S$GLB,, | Performed by: FAMILY MEDICINE

## 2018-12-12 PROCEDURE — 36415 COLL VENOUS BLD VENIPUNCTURE: CPT | Mod: PO

## 2018-12-12 PROCEDURE — 90686 IIV4 VACC NO PRSV 0.5 ML IM: CPT | Mod: S$GLB,,, | Performed by: FAMILY MEDICINE

## 2018-12-12 PROCEDURE — 3078F DIAST BP <80 MM HG: CPT | Mod: CPTII,S$GLB,, | Performed by: FAMILY MEDICINE

## 2018-12-12 RX ORDER — BENAZEPRIL HYDROCHLORIDE 10 MG/1
10 TABLET ORAL DAILY
Qty: 90 TABLET | Refills: 3 | Status: SHIPPED | OUTPATIENT
Start: 2018-12-12 | End: 2020-01-22

## 2018-12-12 RX ORDER — MELOXICAM 15 MG/1
15 TABLET ORAL DAILY
Qty: 30 TABLET | Refills: 11 | Status: SHIPPED | OUTPATIENT
Start: 2018-12-12 | End: 2019-11-27 | Stop reason: SDUPTHER

## 2018-12-12 RX ORDER — GABAPENTIN 300 MG/1
300 CAPSULE ORAL 2 TIMES DAILY
Qty: 180 CAPSULE | Refills: 3 | Status: SHIPPED | OUTPATIENT
Start: 2018-12-12 | End: 2018-12-14

## 2018-12-12 NOTE — PROGRESS NOTES
"He needs immunizations for [employer].  I reviewed all and will update that.  Subjective:      Patient ID: Kofi Damian is a 38 y.o. male.    Chief Complaint: Annual Exam    Problem List Items Addressed This Visit     Essential hypertension    Overview     The patient presents with essential hypertension.  The patient is tolerating the medication well and is in excellent compliance.  The patient is experiencing no side effects.  Counseling was offered regarding low salt diets.  The patient has a reduced salt intake.  The patient denies chest pain, palpitations, shortness of breath, dyspnea on exertion, left or murmur neck pain, nausea, vomiting, diaphoresis, paroxysmal nocturnal dyspnea, and orthopnea.   /77   Pulse 66   Temp 98.2 °F (36.8 °C) (Oral)   Ht 6' 2" (1.88 m)   Wt 101.6 kg (223 lb 15.8 oz)   BMI 28.76 kg/m²   He is on lotensin for this now.         Relevant Medications    benazepril (LOTENSIN) 10 MG tablet    Thoracic disc herniation - Primary    Overview     He has a thoracic disc herniation and he has been treated medically.  He had been evaluated by Dr. Quiñonez.  Dr. Quiñonez had wanted him to see another doctor for a second opinion and the patient never got an appointment time, he states.  At this point, he does not want to pursue surgery.  Gabapentin is not helping that much now.  He states that he has had problems with memory retention on the medicine that started a few months ago.             Other Visit Diagnoses     Chronic midline thoracic back pain        Relevant Medications    gabapentin (NEURONTIN) 300 MG capsule          Past Medical History:  Past Medical History:   Diagnosis Date    Back pain     Facet arthropathy, lumbosacral     Family history of early CAD 8/10/2016    His father  at the age of 48 from an MI    L4-L5 disc bulge      Past Surgical History:   Procedure Laterality Date    COLONOSCOPY N/A 2016    Procedure: COLONOSCOPY;  Surgeon: Óscar Kelly MD;  " Location: [facility];  Service: Endoscopy;  Laterality: N/A;    COLONOSCOPY N/A 8/23/2016    Performed by [facility] MD at [facility]    ESOPHAGOGASTRODUODENOSCOPY (EGD) N/A 8/23/2016    Performed by [facility] at [facility] ENDO    LIPOMA RESECTION      WRIST SURGERY       Review of patient's allergies indicates:   Allergen Reactions    Demerol [meperidine] Rash    Cymbalta [duloxetine] Other (See Comments)     Made him shake, not sleep and have suicidal thoughts.     Current Outpatient Medications on File Prior to Visit   Medication Sig Dispense Refill    acetaminophen (TYLENOL) 325 MG tablet Take 325 mg by mouth every 6 (six) hours as needed for Pain.      ibuprofen (ADVIL,MOTRIN) 200 MG tablet Take 200 mg by mouth every 6 (six) hours as needed for Pain.      [DISCONTINUED] benazepril (LOTENSIN) 10 MG tablet TAKE 1 TABLET (10 MG TOTAL) BY MOUTH ONCE DAILY. 90 tablet 3    [DISCONTINUED] gabapentin (NEURONTIN) 300 MG capsule Take 1 po tid 270 capsule 3     No current facility-administered medications on file prior to visit.      Social History     Socioeconomic History    Marital status:      Spouse name: Not on file    Number of children: Not on file    Years of education: Not on file    Highest education level: Not on file   Social Needs    Financial resource strain: Not on file    Food insecurity - worry: Not on file    Food insecurity - inability: Not on file    Transportation needs - medical: Not on file    Transportation needs - non-medical: Not on file   Occupational History     Employer: [employer]   Tobacco Use    Smoking status: Former Smoker     Packs/day: 1.00     Years: 15.00     Pack years: 15.00     Start date: 12/21/2013    Smokeless tobacco: Former User   Substance and Sexual Activity    Alcohol use: Yes     Comment: stopped 04/2016    Drug use: No    Sexual activity: Yes     Partners: Female   Other Topics Concern    Not on file   Social History Narrative     "Not on file     Family History   Problem Relation Age of Onset    Hypertension Mother     Heart disease Father        Review of Systems   Constitutional: Negative.  Negative for chills, diaphoresis and fever.   HENT: Negative for congestion, hearing loss, mouth sores, postnasal drip and sore throat.    Eyes: Negative for pain and visual disturbance.   Respiratory: Negative for cough, chest tightness, shortness of breath and wheezing.    Cardiovascular: Negative for chest pain.   Gastrointestinal: Negative for abdominal pain, anal bleeding, blood in stool, constipation, diarrhea, nausea and vomiting.   Genitourinary: Negative for dysuria and hematuria.   Musculoskeletal: Positive for back pain. Negative for neck pain and neck stiffness.   Skin: Negative for rash.   Neurological: Negative for dizziness and weakness.       Objective:     /78   Pulse 80   Temp 98.2 °F (36.8 °C) (Oral)   Ht 6' 2" (1.88 m)   Wt 111.3 kg (245 lb 6.4 oz)   BMI 31.51 kg/m²     Physical Exam   Constitutional: He appears well-developed and well-nourished.   Cardiovascular: Normal rate, regular rhythm and normal heart sounds. Exam reveals no gallop and no friction rub.   No murmur heard.  Pulmonary/Chest: Effort normal and breath sounds normal. No respiratory distress. He has no wheezes. He has no rales.   Musculoskeletal: He exhibits no edema.     Assessment:     1. Annual physical exam    2. Thoracic disc herniation    3. Chronic midline thoracic back pain    4. Essential hypertension        Plan:     Problem List Items Addressed This Visit     Essential hypertension    Relevant Medications    benazepril (LOTENSIN) 10 MG tablet    Other Relevant Orders    Basic metabolic panel    TSH    Thoracic disc herniation      Other Visit Diagnoses     Annual physical exam    -  Primary    Chronic midline thoracic back pain        Relevant Medications    gabapentin (NEURONTIN) 300 MG capsule          Kofi was seen today for annual " exam.    Diagnoses and all orders for this visit:    Thoracic disc herniation    Chronic midline thoracic back pain  -     gabapentin (NEURONTIN) 300 MG capsule; Take 1 capsule (300 mg total) by mouth 2 (two) times daily. Take 1 po tid    Essential hypertension  -     benazepril (LOTENSIN) 10 MG tablet; Take 1 tablet (10 mg total) by mouth once daily.  -     Basic metabolic panel; Future  -     TSH; Future    Other orders  -     Influenza - Quadrivalent (3 years & older) (PF)  -     Meningococcal Conjugate - MCV4P (MENACTRA)  -     meloxicam (MOBIC) 15 MG tablet; Take 1 tablet (15 mg total) by mouth once daily.        No Follow-up on file.

## 2018-12-13 ENCOUNTER — LAB VISIT (OUTPATIENT)
Dept: LAB | Facility: HOSPITAL | Age: 38
End: 2018-12-13
Attending: FAMILY MEDICINE
Payer: COMMERCIAL

## 2018-12-13 DIAGNOSIS — Z00.00 ANNUAL PHYSICAL EXAM: ICD-10-CM

## 2018-12-13 PROCEDURE — 86762 RUBELLA ANTIBODY: CPT

## 2018-12-13 PROCEDURE — 86765 RUBEOLA ANTIBODY: CPT

## 2018-12-13 PROCEDURE — 86735 MUMPS ANTIBODY: CPT

## 2018-12-13 PROCEDURE — 36415 COLL VENOUS BLD VENIPUNCTURE: CPT | Mod: PO

## 2018-12-14 ENCOUNTER — TELEPHONE (OUTPATIENT)
Dept: FAMILY MEDICINE | Facility: CLINIC | Age: 38
End: 2018-12-14

## 2018-12-14 DIAGNOSIS — M54.6 CHRONIC MIDLINE THORACIC BACK PAIN: ICD-10-CM

## 2018-12-14 DIAGNOSIS — G89.29 CHRONIC MIDLINE THORACIC BACK PAIN: ICD-10-CM

## 2018-12-14 LAB
RUBV IGG SER-ACNC: 20.6 IU/ML
RUBV IGG SER-IMP: REACTIVE

## 2018-12-14 RX ORDER — GABAPENTIN 300 MG/1
300 CAPSULE ORAL 2 TIMES DAILY
Qty: 180 CAPSULE | Refills: 3 | Status: SHIPPED | OUTPATIENT
Start: 2018-12-14 | End: 2019-01-22 | Stop reason: SDUPTHER

## 2018-12-14 NOTE — TELEPHONE ENCOUNTER
I have fixed it.       I have signed for the following orders AND/OR meds.  Please call the patient and ask the patient to schedule the testing AND/OR inform about any medications that were sent.      No orders of the defined types were placed in this encounter.      Medications Ordered This Encounter   Medications    gabapentin (NEURONTIN) 300 MG capsule     Sig: Take 1 capsule (300 mg total) by mouth 2 (two) times daily.     Dispense:  180 capsule     Refill:  3

## 2018-12-14 NOTE — TELEPHONE ENCOUNTER
l----- Message from Fannie Dexter sent at 12/14/2018  2:51 PM CST -----  Contact: [facility] [phone]  Would like to consult with nurse regarding clarification of Gabapentin prescription.  Please call back at [phone]. Md Nina

## 2018-12-14 NOTE — TELEPHONE ENCOUNTER
Called patient back, notified patient of Gabapentin instructions, notified patient of having shot record and form school form to be ready for  on Monday per patient's request.

## 2018-12-14 NOTE — TELEPHONE ENCOUNTER
----- Message from Gallito Echavarria sent at 12/14/2018  3:33 PM CST -----  Pt is returning a call to nurse.        Please call pt back at [phone]

## 2018-12-14 NOTE — TELEPHONE ENCOUNTER
Called and spoke with Cmai at [facility] pharmacy, Cami states that they have received a new prescription stating the directions of Gabapentin 300mg 1 tablet twice a day.

## 2018-12-14 NOTE — TELEPHONE ENCOUNTER
Called and spoke with Cami at [facility] whom states that they received the rx for gabapentin 300mg and noticed that the directions have him taking 1 tablet twice a day and then behind that the script states 1po tid.  Cami is asking for clarification on the way he is to take the medication.  Please advise

## 2018-12-15 LAB
MUMPS IGG INTERPRETATION: POSITIVE
MUMPS IGG SCREEN: 1.5 ISR
RUBEOLA IGG ANTIBODY: 1.22 ISR
RUBEOLA INTERPRETATION: POSITIVE

## 2018-12-19 ENCOUNTER — TELEPHONE (OUTPATIENT)
Dept: FAMILY MEDICINE | Facility: CLINIC | Age: 38
End: 2018-12-19

## 2019-01-22 DIAGNOSIS — M54.6 CHRONIC MIDLINE THORACIC BACK PAIN: ICD-10-CM

## 2019-01-22 DIAGNOSIS — G89.29 CHRONIC MIDLINE THORACIC BACK PAIN: ICD-10-CM

## 2019-01-22 RX ORDER — GABAPENTIN 300 MG/1
CAPSULE ORAL
Qty: 270 CAPSULE | Refills: 3 | Status: SHIPPED | OUTPATIENT
Start: 2019-01-22 | End: 2019-06-24

## 2019-06-24 ENCOUNTER — OFFICE VISIT (OUTPATIENT)
Dept: FAMILY MEDICINE | Facility: CLINIC | Age: 39
End: 2019-06-24
Payer: COMMERCIAL

## 2019-06-24 VITALS
WEIGHT: 243 LBS | HEART RATE: 62 BPM | SYSTOLIC BLOOD PRESSURE: 128 MMHG | TEMPERATURE: 98 F | HEIGHT: 74 IN | BODY MASS INDEX: 31.18 KG/M2 | DIASTOLIC BLOOD PRESSURE: 84 MMHG

## 2019-06-24 DIAGNOSIS — G89.29 CHRONIC MIDLINE THORACIC BACK PAIN: Primary | ICD-10-CM

## 2019-06-24 DIAGNOSIS — M51.24 THORACIC DISC HERNIATION: ICD-10-CM

## 2019-06-24 DIAGNOSIS — M54.6 CHRONIC MIDLINE THORACIC BACK PAIN: Primary | ICD-10-CM

## 2019-06-24 PROCEDURE — 3079F DIAST BP 80-89 MM HG: CPT | Mod: CPTII,S$GLB,, | Performed by: FAMILY MEDICINE

## 2019-06-24 PROCEDURE — 90732 PPSV23 VACC 2 YRS+ SUBQ/IM: CPT | Mod: S$GLB,,, | Performed by: FAMILY MEDICINE

## 2019-06-24 PROCEDURE — 99214 PR OFFICE/OUTPT VISIT, EST, LEVL IV, 30-39 MIN: ICD-10-PCS | Mod: 25,S$GLB,, | Performed by: FAMILY MEDICINE

## 2019-06-24 PROCEDURE — 3008F PR BODY MASS INDEX (BMI) DOCUMENTED: ICD-10-PCS | Mod: CPTII,S$GLB,, | Performed by: FAMILY MEDICINE

## 2019-06-24 PROCEDURE — 3074F PR MOST RECENT SYSTOLIC BLOOD PRESSURE < 130 MM HG: ICD-10-PCS | Mod: CPTII,S$GLB,, | Performed by: FAMILY MEDICINE

## 2019-06-24 PROCEDURE — 99999 PR PBB SHADOW E&M-EST. PATIENT-LVL III: CPT | Mod: PBBFAC,,, | Performed by: FAMILY MEDICINE

## 2019-06-24 PROCEDURE — 90471 IMMUNIZATION ADMIN: CPT | Mod: S$GLB,,, | Performed by: FAMILY MEDICINE

## 2019-06-24 PROCEDURE — 3074F SYST BP LT 130 MM HG: CPT | Mod: CPTII,S$GLB,, | Performed by: FAMILY MEDICINE

## 2019-06-24 PROCEDURE — 99214 OFFICE O/P EST MOD 30 MIN: CPT | Mod: 25,S$GLB,, | Performed by: FAMILY MEDICINE

## 2019-06-24 PROCEDURE — 99999 PR PBB SHADOW E&M-EST. PATIENT-LVL III: ICD-10-PCS | Mod: PBBFAC,,, | Performed by: FAMILY MEDICINE

## 2019-06-24 PROCEDURE — 90732 PNEUMOCOCCAL POLYSACCHARIDE VACCINE 23-VALENT =>2YO SQ IM: ICD-10-PCS | Mod: S$GLB,,, | Performed by: FAMILY MEDICINE

## 2019-06-24 PROCEDURE — 3008F BODY MASS INDEX DOCD: CPT | Mod: CPTII,S$GLB,, | Performed by: FAMILY MEDICINE

## 2019-06-24 PROCEDURE — 90471 PNEUMOCOCCAL POLYSACCHARIDE VACCINE 23-VALENT =>2YO SQ IM: ICD-10-PCS | Mod: S$GLB,,, | Performed by: FAMILY MEDICINE

## 2019-06-24 PROCEDURE — 3079F PR MOST RECENT DIASTOLIC BLOOD PRESSURE 80-89 MM HG: ICD-10-PCS | Mod: CPTII,S$GLB,, | Performed by: FAMILY MEDICINE

## 2019-06-24 RX ORDER — CYCLOBENZAPRINE HCL 10 MG
10 TABLET ORAL NIGHTLY
Qty: 30 TABLET | Refills: 5 | Status: SHIPPED | OUTPATIENT
Start: 2019-06-24 | End: 2019-07-04

## 2019-06-24 RX ORDER — GABAPENTIN 600 MG/1
600 TABLET ORAL 3 TIMES DAILY
Qty: 90 TABLET | Refills: 11 | Status: SHIPPED | OUTPATIENT
Start: 2019-06-24 | End: 2020-06-30 | Stop reason: SDUPTHER

## 2019-06-24 NOTE — PROGRESS NOTES
CC:LOW BACK PAIN  HPI:  Kofi Damian is a 38 y.o. male complains of chronic mid back pain. 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 He is also in class a lot. He has mid back pain that is worse on the right than left.     MRI Thoracic Spine Without Contrast   Order: [id number]   Status:  Final result   Visible to patient:  Yes (Patient Portal) Next appt:  None Dx:  Thoracic disc herniation   Details     Reading Physician Reading Date Result Priority   Lavern Khan MD 7/11/2017    Lilli Gorman MD 7/11/2017       Narrative       Comparison: 10/26/2016, CT liver biopsy on 10/17/2016    Technique: Multiplanar MR imaging of the thoracic spine was performed utilizing sagittal T1, T2, and STIR, and axial T2 pulse sequences.    Findings:    The thoracic spine demonstrates proper alignment. The vertebral bodies show normal signal intensity and height with no indication of acute fracture or pathologic marrow replacement process. The intervertebral disk spaces also appear well-maintained. There is a left paracentral posterior osteophyte at the level of T7-T8 abutting the left lateral cord, similar to prior exam without spinal canal stenosis or neural foraminal narrowing.  There is a small focal right paracentral disc protrusion at the level of T8-T9 and T12-L1 without spinal canal stenosis or neural foraminal narrowing.    The demonstrated portion of the spinal cord is normal in signal intensity at all levels with no indication of myelomalacia or cord edema.     No intradural signal abnormalities are apparent.     Evaluation of the surrounding soft tissue structures are unremarkable.      Impression         Stable left paracentral posterior osteophyte at the level of T7-T8 abutting the left lateral cord without spinal canal stenosis.  ______________________________________     Electronically signed by resident: LILLI GORMAN MD  Date: 07/11/17  Time: 13:53               This  "is evaluated as a personal injury. He first noted symptoms severalyears ago. It was not related to no known injury. The pain is rated severe, and is located at the mid back. The pain is described as aching and occurs all day. The pain is positional with bending or lifting, without radiation down the legs. The symptoms has been progressive. Symptoms are exacerbated by lifting. Factors which relieve the pain include nothing. Other associated symptoms include no other symptoms. Previous history of symptoms: the problem is long-standing.   Treatment efforts have included OTC NSAIDS, without relief.  There is no numbness in the legs.  The patient's Health Maintenance was reviewed and the following appears to be due at this time:  Health Maintenance Due   Topic Date Due    Pneumococcal Vaccine (Medium Risk) (1 of 1 - PPSV23) 07/27/2001     PMH:   Prior history of back problems: .     The current allergy list that we have since it was last reconciled is as follows:  Review of patient's allergies indicates:   Allergen Reactions    Demerol [meperidine] Rash    Cymbalta [duloxetine] Other (See Comments)     Made him shake, not sleep and have suicidal thoughts.     Outpatient Medications Prior to Visit   Medication Sig Dispense Refill    benazepril (LOTENSIN) 10 MG tablet Take 1 tablet (10 mg total) by mouth once daily. 90 tablet 3    gabapentin (NEURONTIN) 300 MG capsule TAKE ONE CAPSULE BY MOUTH 3 TIMES A  capsule 3    ibuprofen (ADVIL,MOTRIN) 200 MG tablet Take 200 mg by mouth every 6 (six) hours as needed for Pain.      meloxicam (MOBIC) 15 MG tablet Take 1 tablet (15 mg total) by mouth once daily. 30 tablet 11    acetaminophen (TYLENOL) 325 MG tablet Take 325 mg by mouth every 6 (six) hours as needed for Pain.       No facility-administered medications prior to visit.         Physical Exam   /84   Pulse 62   Temp 98.1 °F (36.7 °C) (Oral)   Ht 6' 2" (1.88 m)   Wt 110.2 kg (243 lb)   BMI 31.20 " kg/m²   Musculoskeletal:        Lumbar back: The patient exhibits tenderness, pain and spasm in the paraspinous musculature.  Lumbosacral spine area reveals no swelling, deformity, or mass. Painful and reduced LS ROM noted in the latissimus dorsi on the affected side.    Neurological:  The patient is alert and has normal strength. There is no atrophy and no tremor. No sensory deficit. The patient exhibits a normal muscle tone. Coordination and gait normal.he patient has a negative straight leg raise bilaterally.       Encounter Diagnoses   Name Primary?    Thoracic disc herniation     Chronic midline thoracic back pain Yes       PLAN:    I have discontinued Kofi Damian's acetaminophen. I am also having him start on gabapentin and cyclobenzaprine. Additionally, I am having him maintain his ibuprofen, meloxicam, and benazepril.    Kofi was seen today for back pain.    Diagnoses and all orders for this visit:    Chronic midline thoracic back pain  -     gabapentin (NEURONTIN) 600 MG tablet; Take 1 tablet (600 mg total) by mouth 3 (three) times daily.  -     cyclobenzaprine (FLEXERIL) 10 MG tablet; Take 1 tablet (10 mg total) by mouth every evening. for 10 days    Thoracic disc herniation  -     gabapentin (NEURONTIN) 600 MG tablet; Take 1 tablet (600 mg total) by mouth 3 (three) times daily.  -     cyclobenzaprine (FLEXERIL) 10 MG tablet; Take 1 tablet (10 mg total) by mouth every evening. for 10 days    Other orders  -     Pneumococcal Polysaccharide Vaccine (23 Valent) (SQ/IM)         Orders Placed This Encounter   Procedures    Pneumococcal Polysaccharide Vaccine (23 Valent) (SQ/IM)     Administer a Pneumococcal 23 vaccine to the patient.     RTC if symptoms are worsening or changing significantly or if not improved by the end of therapy.  For acute pain, rest, intermittent application of cold packs (later, may switch to heat, but do not sleep on heating pad), analgesics and muscle relaxants are recommended.  Discussed longer term treatment plan of prn NSAID's and discussed a home back care exercise program with flexion exercise routine. Proper lifting with avoidance of heavy lifting discussed. Call or return to clinic prn if these symptoms worsen or fail to improve as anticipated.

## 2019-11-29 RX ORDER — MELOXICAM 15 MG/1
TABLET ORAL
Qty: 90 TABLET | Refills: 0 | Status: SHIPPED | OUTPATIENT
Start: 2019-11-29 | End: 2020-08-05

## 2020-01-22 DIAGNOSIS — I10 ESSENTIAL HYPERTENSION: ICD-10-CM

## 2020-01-22 RX ORDER — BENAZEPRIL HYDROCHLORIDE 10 MG/1
TABLET ORAL
Qty: 90 TABLET | Refills: 3 | Status: SHIPPED | OUTPATIENT
Start: 2020-01-22 | End: 2020-08-05 | Stop reason: SDUPTHER

## 2020-06-30 DIAGNOSIS — M51.24 THORACIC DISC HERNIATION: ICD-10-CM

## 2020-06-30 DIAGNOSIS — G89.29 CHRONIC MIDLINE THORACIC BACK PAIN: ICD-10-CM

## 2020-06-30 DIAGNOSIS — M54.6 CHRONIC MIDLINE THORACIC BACK PAIN: ICD-10-CM

## 2020-06-30 RX ORDER — GABAPENTIN 600 MG/1
600 TABLET ORAL 3 TIMES DAILY
Qty: 90 TABLET | Refills: 0 | Status: SHIPPED | OUTPATIENT
Start: 2020-06-30 | End: 2020-07-16

## 2020-06-30 NOTE — TELEPHONE ENCOUNTER
I refilled the requested medication x 1 month.    The patient is due for an visit in the office.  Call the patient on the phone and book the patient with Amber Calderon NP for a visit.     PLEASE DOCUMENT THE FACT THAT YOU HAVE CONTACTED THE PATIENT IN THE CHART FOR FUTURE REFERENCE.    Health Maintenance Due   Topic Date Due    HIV Screening  07/27/1997

## 2020-08-05 ENCOUNTER — OFFICE VISIT (OUTPATIENT)
Dept: FAMILY MEDICINE | Facility: CLINIC | Age: 40
End: 2020-08-05
Payer: COMMERCIAL

## 2020-08-05 ENCOUNTER — LAB VISIT (OUTPATIENT)
Dept: LAB | Facility: HOSPITAL | Age: 40
End: 2020-08-05
Attending: FAMILY MEDICINE
Payer: COMMERCIAL

## 2020-08-05 VITALS
DIASTOLIC BLOOD PRESSURE: 84 MMHG | WEIGHT: 244 LBS | SYSTOLIC BLOOD PRESSURE: 136 MMHG | HEIGHT: 74 IN | TEMPERATURE: 99 F | BODY MASS INDEX: 31.32 KG/M2 | HEART RATE: 82 BPM

## 2020-08-05 DIAGNOSIS — Z82.49 FAMILY HISTORY OF EARLY CAD: ICD-10-CM

## 2020-08-05 DIAGNOSIS — I10 ESSENTIAL HYPERTENSION: ICD-10-CM

## 2020-08-05 DIAGNOSIS — Z11.4 ENCOUNTER FOR SCREENING FOR HIV: ICD-10-CM

## 2020-08-05 DIAGNOSIS — E78.1 HYPERTRIGLYCERIDEMIA: ICD-10-CM

## 2020-08-05 DIAGNOSIS — M51.24 THORACIC DISC HERNIATION: ICD-10-CM

## 2020-08-05 DIAGNOSIS — M54.6 CHRONIC MIDLINE THORACIC BACK PAIN: ICD-10-CM

## 2020-08-05 DIAGNOSIS — K75.81 NASH (NONALCOHOLIC STEATOHEPATITIS): ICD-10-CM

## 2020-08-05 DIAGNOSIS — G89.29 CHRONIC MIDLINE THORACIC BACK PAIN: ICD-10-CM

## 2020-08-05 DIAGNOSIS — Z00.00 ANNUAL PHYSICAL EXAM: Primary | ICD-10-CM

## 2020-08-05 LAB
ALBUMIN SERPL BCP-MCNC: 4.5 G/DL (ref 3.5–5.2)
ALP SERPL-CCNC: 81 U/L (ref 55–135)
ALT SERPL W/O P-5'-P-CCNC: 42 U/L (ref 10–44)
ANION GAP SERPL CALC-SCNC: 7 MMOL/L (ref 8–16)
AST SERPL-CCNC: 19 U/L (ref 10–40)
BILIRUB SERPL-MCNC: 0.5 MG/DL (ref 0.1–1)
BUN SERPL-MCNC: 15 MG/DL (ref 6–20)
CALCIUM SERPL-MCNC: 9.8 MG/DL (ref 8.7–10.5)
CHLORIDE SERPL-SCNC: 107 MMOL/L (ref 95–110)
CHOLEST SERPL-MCNC: 160 MG/DL (ref 120–199)
CHOLEST/HDLC SERPL: 4.7 {RATIO} (ref 2–5)
CO2 SERPL-SCNC: 26 MMOL/L (ref 23–29)
CREAT SERPL-MCNC: 0.9 MG/DL (ref 0.5–1.4)
EST. GFR  (AFRICAN AMERICAN): >60 ML/MIN/1.73 M^2
EST. GFR  (NON AFRICAN AMERICAN): >60 ML/MIN/1.73 M^2
GLUCOSE SERPL-MCNC: 85 MG/DL (ref 70–110)
HDLC SERPL-MCNC: 34 MG/DL (ref 40–75)
HDLC SERPL: 21.3 % (ref 20–50)
LDLC SERPL CALC-MCNC: 107.2 MG/DL (ref 63–159)
NONHDLC SERPL-MCNC: 126 MG/DL
POTASSIUM SERPL-SCNC: 4.7 MMOL/L (ref 3.5–5.1)
PROT SERPL-MCNC: 7.6 G/DL (ref 6–8.4)
SODIUM SERPL-SCNC: 140 MMOL/L (ref 136–145)
TRIGL SERPL-MCNC: 94 MG/DL (ref 30–150)

## 2020-08-05 PROCEDURE — 3075F PR MOST RECENT SYSTOLIC BLOOD PRESS GE 130-139MM HG: ICD-10-PCS | Mod: CPTII,S$GLB,, | Performed by: FAMILY MEDICINE

## 2020-08-05 PROCEDURE — 99999 PR PBB SHADOW E&M-EST. PATIENT-LVL III: ICD-10-PCS | Mod: PBBFAC,,, | Performed by: FAMILY MEDICINE

## 2020-08-05 PROCEDURE — 86703 HIV-1/HIV-2 1 RESULT ANTBDY: CPT

## 2020-08-05 PROCEDURE — 3075F SYST BP GE 130 - 139MM HG: CPT | Mod: CPTII,S$GLB,, | Performed by: FAMILY MEDICINE

## 2020-08-05 PROCEDURE — 99999 PR PBB SHADOW E&M-EST. PATIENT-LVL III: CPT | Mod: PBBFAC,,, | Performed by: FAMILY MEDICINE

## 2020-08-05 PROCEDURE — 3079F DIAST BP 80-89 MM HG: CPT | Mod: CPTII,S$GLB,, | Performed by: FAMILY MEDICINE

## 2020-08-05 PROCEDURE — 80061 LIPID PANEL: CPT

## 2020-08-05 PROCEDURE — 3079F PR MOST RECENT DIASTOLIC BLOOD PRESSURE 80-89 MM HG: ICD-10-PCS | Mod: CPTII,S$GLB,, | Performed by: FAMILY MEDICINE

## 2020-08-05 PROCEDURE — 99395 PR PREVENTIVE VISIT,EST,18-39: ICD-10-PCS | Mod: S$GLB,,, | Performed by: FAMILY MEDICINE

## 2020-08-05 PROCEDURE — 80053 COMPREHEN METABOLIC PANEL: CPT

## 2020-08-05 PROCEDURE — 99395 PREV VISIT EST AGE 18-39: CPT | Mod: S$GLB,,, | Performed by: FAMILY MEDICINE

## 2020-08-05 PROCEDURE — 36415 COLL VENOUS BLD VENIPUNCTURE: CPT | Mod: PO

## 2020-08-05 RX ORDER — CYCLOBENZAPRINE HCL 5 MG
5 TABLET ORAL
COMMUNITY
End: 2021-07-30

## 2020-08-05 RX ORDER — GABAPENTIN 600 MG/1
600 TABLET ORAL 3 TIMES DAILY
Qty: 270 TABLET | Refills: 3 | Status: SHIPPED | OUTPATIENT
Start: 2020-08-05 | End: 2021-07-30

## 2020-08-05 RX ORDER — BENAZEPRIL HYDROCHLORIDE 10 MG/1
10 TABLET ORAL DAILY
Qty: 90 TABLET | Refills: 3 | Status: SHIPPED | OUTPATIENT
Start: 2020-08-05 | End: 2021-07-30 | Stop reason: SDUPTHER

## 2020-08-06 LAB — HIV 1+2 AB+HIV1 P24 AG SERPL QL IA: NEGATIVE

## 2021-07-30 ENCOUNTER — LAB VISIT (OUTPATIENT)
Dept: LAB | Facility: HOSPITAL | Age: 41
End: 2021-07-30
Attending: FAMILY MEDICINE
Payer: COMMERCIAL

## 2021-07-30 ENCOUNTER — OFFICE VISIT (OUTPATIENT)
Dept: FAMILY MEDICINE | Facility: CLINIC | Age: 41
End: 2021-07-30
Payer: COMMERCIAL

## 2021-07-30 VITALS
BODY MASS INDEX: 30.8 KG/M2 | WEIGHT: 240 LBS | SYSTOLIC BLOOD PRESSURE: 114 MMHG | DIASTOLIC BLOOD PRESSURE: 76 MMHG | HEIGHT: 74 IN | HEART RATE: 66 BPM | TEMPERATURE: 98 F

## 2021-07-30 DIAGNOSIS — M54.6 CHRONIC MIDLINE THORACIC BACK PAIN: ICD-10-CM

## 2021-07-30 DIAGNOSIS — R42 DIZZINESS: ICD-10-CM

## 2021-07-30 DIAGNOSIS — G89.29 CHRONIC MIDLINE THORACIC BACK PAIN: ICD-10-CM

## 2021-07-30 DIAGNOSIS — Z00.00 ANNUAL PHYSICAL EXAM: Primary | ICD-10-CM

## 2021-07-30 DIAGNOSIS — I10 ESSENTIAL HYPERTENSION: ICD-10-CM

## 2021-07-30 DIAGNOSIS — Z82.49 FAMILY HISTORY OF EARLY CAD: ICD-10-CM

## 2021-07-30 DIAGNOSIS — K75.81 NASH (NONALCOHOLIC STEATOHEPATITIS): ICD-10-CM

## 2021-07-30 DIAGNOSIS — Z00.00 ANNUAL PHYSICAL EXAM: ICD-10-CM

## 2021-07-30 DIAGNOSIS — M51.24 THORACIC DISC HERNIATION: ICD-10-CM

## 2021-07-30 DIAGNOSIS — E78.1 HYPERTRIGLYCERIDEMIA: ICD-10-CM

## 2021-07-30 LAB
ALBUMIN SERPL BCP-MCNC: 4.2 G/DL (ref 3.5–5.2)
ALP SERPL-CCNC: 78 U/L (ref 55–135)
ALT SERPL W/O P-5'-P-CCNC: 26 U/L (ref 10–44)
ANION GAP SERPL CALC-SCNC: 8 MMOL/L (ref 8–16)
AST SERPL-CCNC: 15 U/L (ref 10–40)
BASOPHILS # BLD AUTO: 0.08 K/UL (ref 0–0.2)
BASOPHILS NFR BLD: 0.9 % (ref 0–1.9)
BILIRUB SERPL-MCNC: 0.7 MG/DL (ref 0.1–1)
BUN SERPL-MCNC: 15 MG/DL (ref 6–20)
CALCIUM SERPL-MCNC: 9.7 MG/DL (ref 8.7–10.5)
CHLORIDE SERPL-SCNC: 108 MMOL/L (ref 95–110)
CHOLEST SERPL-MCNC: 150 MG/DL (ref 120–199)
CHOLEST/HDLC SERPL: 5.4 {RATIO} (ref 2–5)
CO2 SERPL-SCNC: 24 MMOL/L (ref 23–29)
CREAT SERPL-MCNC: 1.1 MG/DL (ref 0.5–1.4)
DIFFERENTIAL METHOD: ABNORMAL
EOSINOPHIL # BLD AUTO: 0.2 K/UL (ref 0–0.5)
EOSINOPHIL NFR BLD: 2.4 % (ref 0–8)
ERYTHROCYTE [DISTWIDTH] IN BLOOD BY AUTOMATED COUNT: 13.2 % (ref 11.5–14.5)
EST. GFR  (AFRICAN AMERICAN): >60 ML/MIN/1.73 M^2
EST. GFR  (NON AFRICAN AMERICAN): >60 ML/MIN/1.73 M^2
GLUCOSE SERPL-MCNC: 94 MG/DL (ref 70–110)
HCT VFR BLD AUTO: 48.6 % (ref 40–54)
HDLC SERPL-MCNC: 28 MG/DL (ref 40–75)
HDLC SERPL: 18.7 % (ref 20–50)
HGB BLD-MCNC: 15.1 G/DL (ref 14–18)
IMM GRANULOCYTES # BLD AUTO: 0.02 K/UL (ref 0–0.04)
IMM GRANULOCYTES NFR BLD AUTO: 0.2 % (ref 0–0.5)
LDLC SERPL CALC-MCNC: 102.2 MG/DL (ref 63–159)
LYMPHOCYTES # BLD AUTO: 2.4 K/UL (ref 1–4.8)
LYMPHOCYTES NFR BLD: 28.1 % (ref 18–48)
MCH RBC QN AUTO: 28.1 PG (ref 27–31)
MCHC RBC AUTO-ENTMCNC: 31.1 G/DL (ref 32–36)
MCV RBC AUTO: 90 FL (ref 82–98)
MONOCYTES # BLD AUTO: 0.5 K/UL (ref 0.3–1)
MONOCYTES NFR BLD: 6 % (ref 4–15)
NEUTROPHILS # BLD AUTO: 5.4 K/UL (ref 1.8–7.7)
NEUTROPHILS NFR BLD: 62.4 % (ref 38–73)
NONHDLC SERPL-MCNC: 122 MG/DL
NRBC BLD-RTO: 0 /100 WBC
PLATELET # BLD AUTO: 254 K/UL (ref 150–450)
PMV BLD AUTO: 11.5 FL (ref 9.2–12.9)
POTASSIUM SERPL-SCNC: 4.7 MMOL/L (ref 3.5–5.1)
PROT SERPL-MCNC: 7.3 G/DL (ref 6–8.4)
RBC # BLD AUTO: 5.38 M/UL (ref 4.6–6.2)
SODIUM SERPL-SCNC: 140 MMOL/L (ref 136–145)
TRIGL SERPL-MCNC: 99 MG/DL (ref 30–150)
TSH SERPL DL<=0.005 MIU/L-ACNC: 1.66 UIU/ML (ref 0.4–4)
WBC # BLD AUTO: 8.66 K/UL (ref 3.9–12.7)

## 2021-07-30 PROCEDURE — 1159F PR MEDICATION LIST DOCUMENTED IN MEDICAL RECORD: ICD-10-PCS | Mod: CPTII,S$GLB,, | Performed by: FAMILY MEDICINE

## 2021-07-30 PROCEDURE — 3074F SYST BP LT 130 MM HG: CPT | Mod: CPTII,S$GLB,, | Performed by: FAMILY MEDICINE

## 2021-07-30 PROCEDURE — 99999 PR PBB SHADOW E&M-EST. PATIENT-LVL III: CPT | Mod: PBBFAC,,, | Performed by: FAMILY MEDICINE

## 2021-07-30 PROCEDURE — 3078F DIAST BP <80 MM HG: CPT | Mod: CPTII,S$GLB,, | Performed by: FAMILY MEDICINE

## 2021-07-30 PROCEDURE — 3008F BODY MASS INDEX DOCD: CPT | Mod: CPTII,S$GLB,, | Performed by: FAMILY MEDICINE

## 2021-07-30 PROCEDURE — 1159F MED LIST DOCD IN RCRD: CPT | Mod: CPTII,S$GLB,, | Performed by: FAMILY MEDICINE

## 2021-07-30 PROCEDURE — 99999 PR PBB SHADOW E&M-EST. PATIENT-LVL III: ICD-10-PCS | Mod: PBBFAC,,, | Performed by: FAMILY MEDICINE

## 2021-07-30 PROCEDURE — 36415 COLL VENOUS BLD VENIPUNCTURE: CPT | Mod: PO | Performed by: FAMILY MEDICINE

## 2021-07-30 PROCEDURE — 85025 COMPLETE CBC W/AUTO DIFF WBC: CPT | Performed by: FAMILY MEDICINE

## 2021-07-30 PROCEDURE — 80053 COMPREHEN METABOLIC PANEL: CPT | Performed by: FAMILY MEDICINE

## 2021-07-30 PROCEDURE — 99395 PREV VISIT EST AGE 18-39: CPT | Mod: S$GLB,,, | Performed by: FAMILY MEDICINE

## 2021-07-30 PROCEDURE — 80061 LIPID PANEL: CPT | Performed by: FAMILY MEDICINE

## 2021-07-30 PROCEDURE — 1126F PR PAIN SEVERITY QUANTIFIED, NO PAIN PRESENT: ICD-10-PCS | Mod: CPTII,S$GLB,, | Performed by: FAMILY MEDICINE

## 2021-07-30 PROCEDURE — 3008F PR BODY MASS INDEX (BMI) DOCUMENTED: ICD-10-PCS | Mod: CPTII,S$GLB,, | Performed by: FAMILY MEDICINE

## 2021-07-30 PROCEDURE — 1126F AMNT PAIN NOTED NONE PRSNT: CPT | Mod: CPTII,S$GLB,, | Performed by: FAMILY MEDICINE

## 2021-07-30 PROCEDURE — 3074F PR MOST RECENT SYSTOLIC BLOOD PRESSURE < 130 MM HG: ICD-10-PCS | Mod: CPTII,S$GLB,, | Performed by: FAMILY MEDICINE

## 2021-07-30 PROCEDURE — 84443 ASSAY THYROID STIM HORMONE: CPT | Performed by: FAMILY MEDICINE

## 2021-07-30 PROCEDURE — 3078F PR MOST RECENT DIASTOLIC BLOOD PRESSURE < 80 MM HG: ICD-10-PCS | Mod: CPTII,S$GLB,, | Performed by: FAMILY MEDICINE

## 2021-07-30 PROCEDURE — 99395 PR PREVENTIVE VISIT,EST,18-39: ICD-10-PCS | Mod: S$GLB,,, | Performed by: FAMILY MEDICINE

## 2021-07-30 RX ORDER — BENAZEPRIL HYDROCHLORIDE 10 MG/1
10 TABLET ORAL DAILY
Qty: 90 TABLET | Refills: 3 | Status: SHIPPED | OUTPATIENT
Start: 2021-07-30 | End: 2022-08-31 | Stop reason: SDUPTHER

## 2021-07-30 RX ORDER — TIZANIDINE 4 MG/1
4 TABLET ORAL NIGHTLY PRN
Qty: 30 TABLET | Refills: 5 | Status: SHIPPED | OUTPATIENT
Start: 2021-07-30 | End: 2022-08-31 | Stop reason: SDUPTHER

## 2021-07-30 RX ORDER — GABAPENTIN 600 MG/1
TABLET ORAL
Qty: 270 TABLET | Refills: 3
Start: 2021-07-30 | End: 2022-08-31

## 2022-05-26 ENCOUNTER — PATIENT MESSAGE (OUTPATIENT)
Dept: FAMILY MEDICINE | Facility: CLINIC | Age: 42
End: 2022-05-26
Payer: COMMERCIAL

## 2022-07-08 ENCOUNTER — PATIENT MESSAGE (OUTPATIENT)
Dept: FAMILY MEDICINE | Facility: CLINIC | Age: 42
End: 2022-07-08
Payer: COMMERCIAL

## 2022-08-24 DIAGNOSIS — I10 ESSENTIAL HYPERTENSION: ICD-10-CM

## 2022-08-31 ENCOUNTER — OFFICE VISIT (OUTPATIENT)
Dept: FAMILY MEDICINE | Facility: CLINIC | Age: 42
End: 2022-08-31
Payer: COMMERCIAL

## 2022-08-31 VITALS
TEMPERATURE: 98 F | BODY MASS INDEX: 28.71 KG/M2 | DIASTOLIC BLOOD PRESSURE: 74 MMHG | HEART RATE: 63 BPM | HEIGHT: 74 IN | WEIGHT: 223.69 LBS | SYSTOLIC BLOOD PRESSURE: 119 MMHG

## 2022-08-31 DIAGNOSIS — Z00.00 ANNUAL PHYSICAL EXAM: Primary | ICD-10-CM

## 2022-08-31 DIAGNOSIS — Z82.49 FAMILY HISTORY OF EARLY CAD: ICD-10-CM

## 2022-08-31 DIAGNOSIS — I10 ESSENTIAL HYPERTENSION: ICD-10-CM

## 2022-08-31 DIAGNOSIS — M54.6 PAIN IN THORACIC SPINE: ICD-10-CM

## 2022-08-31 DIAGNOSIS — M51.24 THORACIC DISC HERNIATION: ICD-10-CM

## 2022-08-31 DIAGNOSIS — K75.81 NASH (NONALCOHOLIC STEATOHEPATITIS): ICD-10-CM

## 2022-08-31 DIAGNOSIS — G89.29 CHRONIC MIDLINE THORACIC BACK PAIN: ICD-10-CM

## 2022-08-31 DIAGNOSIS — E78.1 HYPERTRIGLYCERIDEMIA: ICD-10-CM

## 2022-08-31 DIAGNOSIS — M54.6 CHRONIC MIDLINE THORACIC BACK PAIN: ICD-10-CM

## 2022-08-31 PROCEDURE — 1159F PR MEDICATION LIST DOCUMENTED IN MEDICAL RECORD: ICD-10-PCS | Mod: CPTII,S$GLB,, | Performed by: FAMILY MEDICINE

## 2022-08-31 PROCEDURE — 99396 PREV VISIT EST AGE 40-64: CPT | Mod: 25,S$GLB,, | Performed by: FAMILY MEDICINE

## 2022-08-31 PROCEDURE — 4010F PR ACE/ARB THEARPY RXD/TAKEN: ICD-10-PCS | Mod: CPTII,S$GLB,, | Performed by: FAMILY MEDICINE

## 2022-08-31 PROCEDURE — 4010F ACE/ARB THERAPY RXD/TAKEN: CPT | Mod: CPTII,S$GLB,, | Performed by: FAMILY MEDICINE

## 2022-08-31 PROCEDURE — 99999 PR PBB SHADOW E&M-EST. PATIENT-LVL III: ICD-10-PCS | Mod: PBBFAC,,, | Performed by: FAMILY MEDICINE

## 2022-08-31 PROCEDURE — 3074F SYST BP LT 130 MM HG: CPT | Mod: CPTII,S$GLB,, | Performed by: FAMILY MEDICINE

## 2022-08-31 PROCEDURE — 3078F PR MOST RECENT DIASTOLIC BLOOD PRESSURE < 80 MM HG: ICD-10-PCS | Mod: CPTII,S$GLB,, | Performed by: FAMILY MEDICINE

## 2022-08-31 PROCEDURE — 3078F DIAST BP <80 MM HG: CPT | Mod: CPTII,S$GLB,, | Performed by: FAMILY MEDICINE

## 2022-08-31 PROCEDURE — 3008F BODY MASS INDEX DOCD: CPT | Mod: CPTII,S$GLB,, | Performed by: FAMILY MEDICINE

## 2022-08-31 PROCEDURE — 3008F PR BODY MASS INDEX (BMI) DOCUMENTED: ICD-10-PCS | Mod: CPTII,S$GLB,, | Performed by: FAMILY MEDICINE

## 2022-08-31 PROCEDURE — 1159F MED LIST DOCD IN RCRD: CPT | Mod: CPTII,S$GLB,, | Performed by: FAMILY MEDICINE

## 2022-08-31 PROCEDURE — 90677 PCV20 VACCINE IM: CPT | Mod: S$GLB,,, | Performed by: FAMILY MEDICINE

## 2022-08-31 PROCEDURE — 90471 PNEUMOCOCCAL CONJUGATE VACCINE 20-VALENT: ICD-10-PCS | Mod: S$GLB,,, | Performed by: FAMILY MEDICINE

## 2022-08-31 PROCEDURE — 3074F PR MOST RECENT SYSTOLIC BLOOD PRESSURE < 130 MM HG: ICD-10-PCS | Mod: CPTII,S$GLB,, | Performed by: FAMILY MEDICINE

## 2022-08-31 PROCEDURE — 90471 IMMUNIZATION ADMIN: CPT | Mod: S$GLB,,, | Performed by: FAMILY MEDICINE

## 2022-08-31 PROCEDURE — 90677 PNEUMOCOCCAL CONJUGATE VACCINE 20-VALENT: ICD-10-PCS | Mod: S$GLB,,, | Performed by: FAMILY MEDICINE

## 2022-08-31 PROCEDURE — 99396 PR PREVENTIVE VISIT,EST,40-64: ICD-10-PCS | Mod: 25,S$GLB,, | Performed by: FAMILY MEDICINE

## 2022-08-31 PROCEDURE — 99999 PR PBB SHADOW E&M-EST. PATIENT-LVL III: CPT | Mod: PBBFAC,,, | Performed by: FAMILY MEDICINE

## 2022-08-31 RX ORDER — BENAZEPRIL HYDROCHLORIDE 10 MG/1
10 TABLET ORAL DAILY
Qty: 90 TABLET | Refills: 3 | Status: SHIPPED | OUTPATIENT
Start: 2022-08-31 | End: 2023-09-05

## 2022-08-31 RX ORDER — TIZANIDINE 4 MG/1
4 TABLET ORAL NIGHTLY PRN
Qty: 30 TABLET | Refills: 5 | Status: SHIPPED | OUTPATIENT
Start: 2022-08-31 | End: 2023-06-05

## 2022-08-31 RX ORDER — CLOTRIMAZOLE AND BETAMETHASONE DIPROPIONATE 10; .64 MG/G; MG/G
CREAM TOPICAL 2 TIMES DAILY
Qty: 90 G | Refills: 1 | Status: SHIPPED | OUTPATIENT
Start: 2022-08-31 | End: 2023-09-20 | Stop reason: SDUPTHER

## 2022-08-31 NOTE — PROGRESS NOTES
"Subjective:      Patient ID: Kofi Damian is a 42 y.o. male.    Chief Complaint: Annual Exam        The patient's Health Maintenance was reviewed and the following appears to be due at this time:   Health Maintenance Due   Topic Date Due    Pneumococcal Vaccines (Age 0-64) (2 - PCV) 2020     Problem List Items Addressed This Visit       Essential hypertension    Overview     The patient presents with essential hypertension.  The patient is tolerating the medication well and is in excellent compliance.  The patient is experiencing no side effects.  Counseling was offered regarding low salt diets.  The patient has a reduced salt intake.  The patient denies chest pain, palpitations, shortness of breath, dyspnea on exertion, left or murmur neck pain, nausea, vomiting, diaphoresis, paroxysmal nocturnal dyspnea, and orthopnea.   /74   Pulse 63   Temp 98 °F (36.7 °C) (Oral)   Ht 6' 2" (1.88 m)   Wt 101.5 kg (223 lb 10.5 oz)   BMI 28.72 kg/m²   He is on lotensin for this now.         Relevant Medications    benazepriL (LOTENSIN) 10 MG tablet    Other Relevant Orders    Comprehensive Metabolic Panel    Family history of early CAD    Overview     His father  at the age of 48 from an MI         Hypertriglyceridemia    Overview     The patient presents with hyperlipidemia.    He is not on medicine for this now.  Lab Results   Component Value Date    CHOL 150 2021    CHOL 160 2020    CHOL 135 2017       Lab Results   Component Value Date    HDL 28 (L) 2021    HDL 34 (L) 2020    HDL 30 (L) 2017       Lab Results   Component Value Date    LDLCALC 102.2 2021    LDLCALC 107.2 2020    LDLCALC 93.4 2017       Lab Results   Component Value Date    TRIG 99 2021    TRIG 94 2020    TRIG 58 2017       Lab Results   Component Value Date    CHOLHDL 18.7 (L) 2021    CHOLHDL 21.3 2020    CHOLHDL 22.2 2017     Lab Results "   Component Value Date    ALT 26 07/30/2021    AST 15 07/30/2021    ALKPHOS 78 07/30/2021    BILITOT 0.7 07/30/2021            Relevant Orders    Lipid Panel    SOLORZANO (nonalcoholic steatohepatitis)    Overview     He has SOLORZANO and he has had liver functions done in the past.   He has lost weight to help with this issue.    The last numbers are below:  Lab Results   Component Value Date    ALT 26 07/30/2021    AST 15 07/30/2021    ALKPHOS 78 07/30/2021    BILITOT 0.7 07/30/2021              Thoracic disc herniation    Overview     He has a thoracic disc herniation and he has been treated medically.  He had been evaluated by Dr. Quiñonez.  Gabapentin was not helping that much and he had dizziness so he has stopped it.  Flexeril causes him to feel hung over and he is using tizanidine for prn use.  Historically he had pain on the right side but it has been spreading now to the left for the last 6 months.  He had an MRI years ago. He has been dieting and he has been working out in the gym.  He states that he has been lifting weights.      MRI Thoracic Spine Without Contrast  Order: [id number]  Status: Final result     Visible to patient: Yes (seen)     Next appt: None     Dx: Thoracic disc herniation     0 Result Notes  Details    Reading Physician Reading Date Result Priority   Lavern Khan MD  [phone]  [phone] 7/11/2017    Lilli Prater MD  [phone] 7/11/2017      Narrative & Impression     Comparison: 10/26/2016, CT liver biopsy on 10/17/2016     Technique: Multiplanar MR imaging of the thoracic spine was performed utilizing sagittal T1, T2, and STIR, and axial T2 pulse sequences.     Findings:     The thoracic spine demonstrates proper alignment. The vertebral bodies show normal signal intensity and height with no indication of acute fracture or pathologic marrow replacement process. The intervertebral disk spaces also appear well-maintained. There is a left paracentral posterior osteophyte at the  level of T7-T8 abutting the left lateral cord, similar to prior exam without spinal canal stenosis or neural foraminal narrowing.  There is a small focal right paracentral disc protrusion at the level of T8-T9 and T12-L1 without spinal canal stenosis or neural foraminal narrowing.     The demonstrated portion of the spinal cord is normal in signal intensity at all levels with no indication of myelomalacia or cord edema.      No intradural signal abnormalities are apparent.      Evaluation of the surrounding soft tissue structures are unremarkable.  IMPRESSION:         Stable left paracentral posterior osteophyte at the level of T7-T8 abutting the left lateral cord without spinal canal stenosis.  ______________________________________      Electronically signed by resident: CAROL GORMAN MD  Date:                                            17  Time:                                           13:53                 As the supervising and teaching physician, I personally reviewed the images and resident's interpretation and I agree with the findings.                 Electronically signed by: RUSLAN ROLLE MD  Date:                                            17  Time:                                           14:12               Relevant Medications    tiZANidine (ZANAFLEX) 4 MG tablet     Other Visit Diagnoses       Annual physical exam    -  Primary    Chronic midline thoracic back pain        Relevant Medications    tiZANidine (ZANAFLEX) 4 MG tablet    Pain in thoracic spine        Relevant Orders    MRI Thoracic Spine Without Contrast            Past Medical History:  Past Medical History:   Diagnosis Date    Back pain     Facet arthropathy, lumbosacral     Family history of early CAD 8/10/2016    His father  at the age of 48 from an MI    L4-L5 disc bulge      Past Surgical History:   Procedure Laterality Date    COLONOSCOPY N/A 2016    Procedure: COLONOSCOPY;  Surgeon: Óscar GARCIA  MD Robin;  Location: [facility];  Service: Endoscopy;  Laterality: N/A;    LIPOMA RESECTION      WRIST SURGERY       Review of patient's allergies indicates:   Allergen Reactions    Demerol [meperidine] Rash    Cymbalta [duloxetine] Other (See Comments)     Made him shake, not sleep and have suicidal thoughts.     Current Outpatient Medications on File Prior to Visit   Medication Sig Dispense Refill    [DISCONTINUED] benazepriL (LOTENSIN) 10 MG tablet Take 1 tablet (10 mg total) by mouth once daily. 90 tablet 3    [DISCONTINUED] tiZANidine (ZANAFLEX) 4 MG tablet Take 1 tablet (4 mg total) by mouth nightly as needed (spasm). 30 tablet 5    [DISCONTINUED] gabapentin (NEURONTIN) 600 MG tablet Take 1 po bid x 1 week then 1 po q day x 1 week then 1 po qid x 1 week then stop. 270 tablet 3     No current facility-administered medications on file prior to visit.     Social History     Socioeconomic History    Marital status:    Occupational History     Employer: [employer]   Tobacco Use    Smoking status: Former     Packs/day: 1.00     Years: 15.00     Pack years: 15.00     Types: Cigarettes     Start date: 2013     Quit date: 2021     Years since quittin.6    Smokeless tobacco: Former   Substance and Sexual Activity    Alcohol use: Yes     Comment: stopped 2016    Drug use: No    Sexual activity: Yes     Partners: Female     Social Determinants of Health     Financial Resource Strain: Low Risk     Difficulty of Paying Living Expenses: Not hard at all   Food Insecurity: No Food Insecurity    Worried About Running Out of Food in the Last Year: Never true    Ran Out of Food in the Last Year: Never true   Transportation Needs: No Transportation Needs    Lack of Transportation (Medical): No    Lack of Transportation (Non-Medical): No   Physical Activity: Sufficiently Active    Days of Exercise per Week: 4 days    Minutes of Exercise per Session: 60 min   Stress: No Stress Concern  "Present    Feeling of Stress : Only a little   Social Connections: Unknown    Frequency of Communication with Friends and Family: Once a week    Frequency of Social Gatherings with Friends and Family: Once a week    Active Member of Clubs or Organizations: No    Attends Club or Organization Meetings: Never    Marital Status:    Housing Stability: Low Risk     Unable to Pay for Housing in the Last Year: No    Number of Places Lived in the Last Year: 1    Unstable Housing in the Last Year: No     Family History   Problem Relation Age of Onset    Hypertension Mother     Heart disease Father        Review of Systems   Constitutional:  Negative for activity change and unexpected weight change.   HENT:  Negative for hearing loss, rhinorrhea and trouble swallowing.    Eyes:  Negative for discharge and visual disturbance.   Respiratory:  Negative for chest tightness and wheezing.    Cardiovascular:  Negative for chest pain and palpitations.   Gastrointestinal:  Negative for blood in stool, constipation, diarrhea and vomiting.   Endocrine: Negative for polydipsia and polyuria.   Genitourinary:  Negative for difficulty urinating, hematuria and urgency.   Musculoskeletal:  Positive for back pain. Negative for arthralgias, joint swelling and neck pain.   Neurological:  Negative for weakness and headaches.   Psychiatric/Behavioral:  Negative for confusion and dysphoric mood.      Objective:   /74   Pulse 63   Temp 98 °F (36.7 °C) (Oral)   Ht 6' 2" (1.88 m)   Wt 101.5 kg (223 lb 10.5 oz)   BMI 28.72 kg/m²     Physical Exam  Constitutional:       Appearance: He is well-developed. He is not diaphoretic.   HENT:      Head: Normocephalic and atraumatic.      Right Ear: External ear normal.      Left Ear: External ear normal.      Nose: Nose normal.      Mouth/Throat:      Pharynx: No oropharyngeal exudate.   Eyes:      General: No scleral icterus.        Right eye: No discharge.         Left eye: No " discharge.      Conjunctiva/sclera: Conjunctivae normal.      Pupils: Pupils are equal, round, and reactive to light.   Neck:      Thyroid: No thyromegaly.      Vascular: No JVD.   Cardiovascular:      Rate and Rhythm: Normal rate and regular rhythm.      Heart sounds: Normal heart sounds. No murmur heard.    No friction rub. No gallop.   Pulmonary:      Effort: Pulmonary effort is normal. No respiratory distress.      Breath sounds: Normal breath sounds. No wheezing or rales.   Chest:      Chest wall: No tenderness.   Abdominal:      General: Bowel sounds are normal. There is no distension.      Palpations: Abdomen is soft. There is no mass.      Tenderness: There is no abdominal tenderness. There is no guarding or rebound.   Musculoskeletal:         General: No tenderness. Normal range of motion.      Cervical back: Normal range of motion and neck supple.      Comments: I can't reproduce his pain on palpation.   Lymphadenopathy:      Cervical: No cervical adenopathy.   Skin:     General: Skin is warm and dry.   Neurological:      Mental Status: He is alert and oriented to person, place, and time.      Cranial Nerves: No cranial nerve deficit.      Coordination: Coordination normal.     Assessment:     1. Annual physical exam    2. Thoracic disc herniation    3. SOLORZANO (nonalcoholic steatohepatitis)    4. Hypertriglyceridemia    5. Family history of early CAD    6. Essential hypertension    7. Chronic midline thoracic back pain    8. Pain in thoracic spine      Plan:   I am having Ryan Damian maintain his tiZANidine and benazepriL.  Problem List Items Addressed This Visit       Essential hypertension    Relevant Medications    benazepriL (LOTENSIN) 10 MG tablet    Other Relevant Orders    Comprehensive Metabolic Panel    Family history of early CAD    Hypertriglyceridemia    Relevant Orders    Lipid Panel    SOLORZANO (nonalcoholic steatohepatitis)    Thoracic disc herniation    Relevant Medications    tiZANidine  (ZANAFLEX) 4 MG tablet     Other Visit Diagnoses       Annual physical exam    -  Primary    Chronic midline thoracic back pain        Relevant Medications    tiZANidine (ZANAFLEX) 4 MG tablet    Pain in thoracic spine        Relevant Orders    MRI Thoracic Spine Without Contrast          Follow up in about 4 days (around 9/4/2022) for Arrange imaging ordered today, lab draw of labs ordered today, Arrange immunization(s) ordered.    Kofi was seen today for annual exam.    Diagnoses and all orders for this visit:    Annual physical exam    Thoracic disc herniation  -     tiZANidine (ZANAFLEX) 4 MG tablet; Take 1 tablet (4 mg total) by mouth nightly as needed (spasm).    SOLORZANO (nonalcoholic steatohepatitis)    Hypertriglyceridemia  -     Lipid Panel; Future    Family history of early CAD    Essential hypertension  -     benazepriL (LOTENSIN) 10 MG tablet; Take 1 tablet (10 mg total) by mouth once daily.  -     Comprehensive Metabolic Panel; Future    Chronic midline thoracic back pain  -     tiZANidine (ZANAFLEX) 4 MG tablet; Take 1 tablet (4 mg total) by mouth nightly as needed (spasm).    Pain in thoracic spine  -     MRI Thoracic Spine Without Contrast; Future    Other orders  -     Pneumococcal Conjugate Vaccine (20 Valent) (IM); Future    Medications Ordered This Encounter   Medications    benazepriL (LOTENSIN) 10 MG tablet     Sig: Take 1 tablet (10 mg total) by mouth once daily.     Dispense:  90 tablet     Refill:  3     .    tiZANidine (ZANAFLEX) 4 MG tablet     Sig: Take 1 tablet (4 mg total) by mouth nightly as needed (spasm).     Dispense:  30 tablet     Refill:  5     The patient was instructed to stop the following meds:  Medications Discontinued During This Encounter   Medication Reason    gabapentin (NEURONTIN) 600 MG tablet Patient no longer taking    benazepriL (LOTENSIN) 10 MG tablet Reorder    tiZANidine (ZANAFLEX) 4 MG tablet Reorder     Orders Placed This Encounter   Procedures    MRI  Thoracic Spine Without Contrast     Standing Status:   Future     Standing Expiration Date:   8/31/2023     Order Specific Question:   Does the patient have a pacemaker or a defibrilator (Note: Some facilities may not be able to schedule an MRI for patients with pacemakers and defibrillators. You should contact your local radiology department to determine if this is the case.)?     Answer:   No     Order Specific Question:   Does the patient have an aneurysm or surgical clip, pump, nerve/brain stimulator, middle/inner ear prosthesis, or other metal implant or foreign object (bullet, shrapnel)? If they have a card related to their implant, ask them to bring it. Issues related to the implant may cause the MRI to be delayed.     Answer:   No     Order Specific Question:   Is the patient claustrophobic?     Answer:   No     Order Specific Question:   Will the patient require sedation?     Answer:   No     Order Specific Question:   Does the patient have any of the following conditions? Diabetes, History of Renal Disease or Hypertension requiring medical therapy?     Answer:   Yes     Order Specific Question:   May the Radiologist modify the order per protocol to meet the clinical needs of the patient?     Answer:   Yes     Order Specific Question:   Is this part of a Research Study?     Answer:   No     Order Specific Question:   Recist criteria?     Answer:   Yes     Order Specific Question:   Does the patient have on a skin patch for medication with aluminized backing?     Answer:   No    Pneumococcal Conjugate Vaccine (20 Valent) (IM)     Standing Status:   Future     Standing Expiration Date:   2/29/2024    Comprehensive Metabolic Panel     Standing Status:   Future     Standing Expiration Date:   8/31/2023    Lipid Panel     Standing Status:   Future     Standing Expiration Date:   8/31/2023       Medication List with Changes/Refills   Changed and/or Refilled Medications    Modified Medication Previous  Medication    BENAZEPRIL (LOTENSIN) 10 MG TABLET benazepriL (LOTENSIN) 10 MG tablet       Take 1 tablet (10 mg total) by mouth once daily.    Take 1 tablet (10 mg total) by mouth once daily.    TIZANIDINE (ZANAFLEX) 4 MG TABLET tiZANidine (ZANAFLEX) 4 MG tablet       Take 1 tablet (4 mg total) by mouth nightly as needed (spasm).    Take 1 tablet (4 mg total) by mouth nightly as needed (spasm).   Discontinued Medications    GABAPENTIN (NEURONTIN) 600 MG TABLET    Take 1 po bid x 1 week then 1 po q day x 1 week then 1 po qid x 1 week then stop.      Medication List with Changes/Refills   Changed and/or Refilled Medications    Modified Medication Previous Medication    BENAZEPRIL (LOTENSIN) 10 MG TABLET benazepriL (LOTENSIN) 10 MG tablet       Take 1 tablet (10 mg total) by mouth once daily.    Take 1 tablet (10 mg total) by mouth once daily.       Start Date: 8/31/2022 End Date: --    Start Date: 7/30/2021 End Date: 8/31/2022    TIZANIDINE (ZANAFLEX) 4 MG TABLET tiZANidine (ZANAFLEX) 4 MG tablet       Take 1 tablet (4 mg total) by mouth nightly as needed (spasm).    Take 1 tablet (4 mg total) by mouth nightly as needed (spasm).       Start Date: 8/31/2022 End Date: --    Start Date: 7/30/2021 End Date: 8/31/2022   Discontinued Medications    GABAPENTIN (NEURONTIN) 600 MG TABLET    Take 1 po bid x 1 week then 1 po q day x 1 week then 1 po qid x 1 week then stop.       Start Date: 7/30/2021 End Date: 8/31/2022

## 2022-09-02 ENCOUNTER — LAB VISIT (OUTPATIENT)
Dept: LAB | Facility: HOSPITAL | Age: 42
End: 2022-09-02
Attending: FAMILY MEDICINE
Payer: COMMERCIAL

## 2022-09-02 DIAGNOSIS — E78.1 HYPERTRIGLYCERIDEMIA: ICD-10-CM

## 2022-09-02 DIAGNOSIS — I10 ESSENTIAL HYPERTENSION: ICD-10-CM

## 2022-09-02 LAB
ALBUMIN SERPL BCP-MCNC: 4.3 G/DL (ref 3.5–5.2)
ALP SERPL-CCNC: 62 U/L (ref 55–135)
ALT SERPL W/O P-5'-P-CCNC: 27 U/L (ref 10–44)
ANION GAP SERPL CALC-SCNC: 9 MMOL/L (ref 8–16)
AST SERPL-CCNC: 28 U/L (ref 10–40)
BILIRUB SERPL-MCNC: 0.7 MG/DL (ref 0.1–1)
BUN SERPL-MCNC: 18 MG/DL (ref 6–20)
CALCIUM SERPL-MCNC: 9.6 MG/DL (ref 8.7–10.5)
CHLORIDE SERPL-SCNC: 109 MMOL/L (ref 95–110)
CHOLEST SERPL-MCNC: 153 MG/DL (ref 120–199)
CHOLEST/HDLC SERPL: 3.9 {RATIO} (ref 2–5)
CO2 SERPL-SCNC: 25 MMOL/L (ref 23–29)
CREAT SERPL-MCNC: 0.8 MG/DL (ref 0.5–1.4)
EST. GFR  (NO RACE VARIABLE): >60 ML/MIN/1.73 M^2
GLUCOSE SERPL-MCNC: 87 MG/DL (ref 70–110)
HDLC SERPL-MCNC: 39 MG/DL (ref 40–75)
HDLC SERPL: 25.5 % (ref 20–50)
LDLC SERPL CALC-MCNC: 102.6 MG/DL (ref 63–159)
NONHDLC SERPL-MCNC: 114 MG/DL
POTASSIUM SERPL-SCNC: 4.6 MMOL/L (ref 3.5–5.1)
PROT SERPL-MCNC: 7.2 G/DL (ref 6–8.4)
SODIUM SERPL-SCNC: 143 MMOL/L (ref 136–145)
TRIGL SERPL-MCNC: 57 MG/DL (ref 30–150)

## 2022-09-02 PROCEDURE — 80053 COMPREHEN METABOLIC PANEL: CPT | Performed by: FAMILY MEDICINE

## 2022-09-02 PROCEDURE — 80061 LIPID PANEL: CPT | Performed by: FAMILY MEDICINE

## 2022-09-02 PROCEDURE — 36415 COLL VENOUS BLD VENIPUNCTURE: CPT | Mod: PO | Performed by: FAMILY MEDICINE

## 2022-09-06 NOTE — PROGRESS NOTES
I have reviewed the labs and am recommending the following:  CMP/BMP NORMAL-The electrolytes all appear stable at this time.  This includes kidney functions along with routine electrolytes like sugar, potassium and sodium.  If it was a CMP test, the liver enzymes were noted to be stable also.\r\nLIPID NORMAL SCREEN-The cholesterol panel screening showed levels that are considered at target at this time.  Recheck each year.     Health maintenance items that remain on your list that need to be arranged are listed below.   Please notify me if you are prepared to get them completed.    Influenza Vaccine(1) due on 09/01/2022    Dr. Óscar Kelly

## 2022-09-12 ENCOUNTER — TELEPHONE (OUTPATIENT)
Dept: FAMILY MEDICINE | Facility: CLINIC | Age: 42
End: 2022-09-12
Payer: COMMERCIAL

## 2022-09-12 NOTE — TELEPHONE ENCOUNTER
----- Message from Opal Saavedra sent at 9/12/2022  4:35 PM CDT -----  Contact: Isabela/[phone]  Isabela from [facility] is calling in reference to patient would like to change location of procedure.  He would like procedure done a [facility] in [facility]..Please fax order to [phone]. Or called her at [phone]   Thanks/radha

## 2022-09-13 ENCOUNTER — TELEPHONE (OUTPATIENT)
Dept: FAMILY MEDICINE | Facility: CLINIC | Age: 42
End: 2022-09-13
Payer: COMMERCIAL

## 2022-09-13 NOTE — TELEPHONE ENCOUNTER
----- Message from Glenna Torres sent at 9/13/2022  4:15 PM CDT -----  Contact: Isabela with [employer] of  [phone]  Isabela states the pt wants to have his MRI done at [facility] in [place]. Isabela states the order can be faxed to

## 2022-10-15 ENCOUNTER — PATIENT MESSAGE (OUTPATIENT)
Dept: FAMILY MEDICINE | Facility: CLINIC | Age: 42
End: 2022-10-15
Payer: COMMERCIAL

## 2022-12-23 ENCOUNTER — PATIENT MESSAGE (OUTPATIENT)
Dept: FAMILY MEDICINE | Facility: CLINIC | Age: 42
End: 2022-12-23
Payer: COMMERCIAL

## 2023-03-17 ENCOUNTER — HOSPITAL ENCOUNTER (OUTPATIENT)
Dept: RADIOLOGY | Facility: HOSPITAL | Age: 43
Discharge: HOME | End: 2023-03-17
Attending: INTERNAL MEDICINE
Payer: COMMERCIAL

## 2023-03-17 DIAGNOSIS — M54.50 CHRONIC MIDLINE LOW BACK PAIN WITHOUT SCIATICA: ICD-10-CM

## 2023-03-17 DIAGNOSIS — G89.29 CHRONIC MIDLINE LOW BACK PAIN WITHOUT SCIATICA: ICD-10-CM

## 2023-03-17 DIAGNOSIS — M54.2 NECK PAIN: ICD-10-CM

## 2023-03-17 PROCEDURE — 72202 X-RAY EXAM SI JOINTS 3/> VWS: CPT | Mod: TC,PO

## 2023-03-17 PROCEDURE — 72202 XR SACROILIAC JOINTS COMPLETE: ICD-10-PCS | Mod: 26,,, | Performed by: RADIOLOGY

## 2023-03-17 PROCEDURE — 72100 X-RAY EXAM L-S SPINE 2/3 VWS: CPT | Mod: 26,,, | Performed by: RADIOLOGY

## 2023-03-17 PROCEDURE — 72040 X-RAY EXAM NECK SPINE 2-3 VW: CPT | Mod: 26,,, | Performed by: RADIOLOGY

## 2023-03-17 PROCEDURE — 72100 X-RAY EXAM L-S SPINE 2/3 VWS: CPT | Mod: TC,PO

## 2023-03-17 PROCEDURE — 72202 X-RAY EXAM SI JOINTS 3/> VWS: CPT | Mod: 26,,, | Performed by: RADIOLOGY

## 2023-03-17 PROCEDURE — 72072 XR THORACIC SPINE AP LATERAL AND SWIMMERS: ICD-10-PCS | Mod: 26,,, | Performed by: RADIOLOGY

## 2023-03-17 PROCEDURE — 72100 XR LUMBAR SPINE 2 OR 3 VIEWS: ICD-10-PCS | Mod: 26,,, | Performed by: RADIOLOGY

## 2023-03-17 PROCEDURE — 72040 XR CERVICAL SPINE 2 OR 3 VIEWS: ICD-10-PCS | Mod: 26,,, | Performed by: RADIOLOGY

## 2023-03-17 PROCEDURE — 72040 X-RAY EXAM NECK SPINE 2-3 VW: CPT | Mod: TC,PO

## 2023-03-17 PROCEDURE — 72072 X-RAY EXAM THORAC SPINE 3VWS: CPT | Mod: 26,,, | Performed by: RADIOLOGY

## 2023-06-03 DIAGNOSIS — G89.29 CHRONIC MIDLINE THORACIC BACK PAIN: ICD-10-CM

## 2023-06-03 DIAGNOSIS — M54.6 CHRONIC MIDLINE THORACIC BACK PAIN: ICD-10-CM

## 2023-06-03 DIAGNOSIS — M51.24 THORACIC DISC HERNIATION: ICD-10-CM

## 2023-06-03 NOTE — TELEPHONE ENCOUNTER
Care Due:                  Date            Visit Type   Department     Provider  --------------------------------------------------------------------------------                                MYCHAR                              ANNUAL                              CHECKUP/PHY  [facility] FAMILY  Last Visit: 08-      [facility]       Óscar Kelly                              [facility]                              ANNUAL                              CHECKUP/PHY  [facility] FAM[facility]  Next Visit: 09-      [facility]       Óscar Kelly                                                            Last  Test          Frequency    Reason                     Performed    Due Date  --------------------------------------------------------------------------------    CMP.........  12 months..  benazepriL...............  09- 08-    Health [facility] Embedded Care Due Messages. Reference number: [id number].   6/03/2023 5:20:07 PM CDT

## 2023-06-05 RX ORDER — TIZANIDINE 4 MG/1
TABLET ORAL
Qty: 90 TABLET | Refills: 0 | Status: SHIPPED | OUTPATIENT
Start: 2023-06-05 | End: 2023-09-20 | Stop reason: SDUPTHER

## 2023-09-05 DIAGNOSIS — I10 ESSENTIAL HYPERTENSION: ICD-10-CM

## 2023-09-05 RX ORDER — BENAZEPRIL HYDROCHLORIDE 10 MG/1
10 TABLET ORAL
Qty: 90 TABLET | Refills: 0 | Status: SHIPPED | OUTPATIENT
Start: 2023-09-05 | End: 2023-09-20 | Stop reason: SDUPTHER

## 2023-09-05 NOTE — TELEPHONE ENCOUNTER
I refilled the requested medication x 1 month.  The patient is due for a visit.  Call the patient on the phone and book the patient with   Amber Calderon NP or   Senia Simmons DNP.    PLEASE DOCUMENT THE FACT THAT YOU HAVE CONTACTED THE PATIENT IN THE CHART FOR FUTURE REFERENCE.    Health Maintenance Due   Topic Date Due    Hemoglobin A1c (Diabetic Prevention Screening)  Never done    COVID-19 Vaccine (4 - Pfizer series) 01/25/2022    Influenza Vaccine (1) 09/01/2023

## 2023-09-05 NOTE — TELEPHONE ENCOUNTER
Refill Routing Note   Medication(s) are not appropriate for processing by [facility] for the following reason(s):      Required labs outdated  Required vitals outdated    [facility] action(s):  Defer Care Due:  Labs due            Appointments  past 12m or future 3m with PCP    Date Provider   Last Visit   8/31/2022 Óscar Kelly MD   Next Visit   9/20/2023 Óscar Kelly MD   ED visits in past 90 days: 0        Note composed:3:42 AM 09/05/2023

## 2023-09-05 NOTE — TELEPHONE ENCOUNTER
Care Due:                  Date            Visit Type   Department     Provider  --------------------------------------------------------------------------------                                MYCHAR                              ANNUAL                              CHECKUP/PHY  [facility] FAMILY  Last Visit: 08-      [facility]       Óscar Kelly                              [facility]                              ANNUAL                              CHECKUP/PHY  [facility]ILY  Next Visit: 09-      [facility]       Óscar Kelly                                                            Last  Test          Frequency    Reason                     Performed    Due Date  --------------------------------------------------------------------------------    CMP.........  12 months..  benazepriL...............  09- 08-    Health [facility] Embedded Care Due Messages. Reference number: [id number].   9/05/2023 12:06:32 AM CDT

## 2023-09-20 ENCOUNTER — OFFICE VISIT (OUTPATIENT)
Dept: FAMILY MEDICINE | Facility: CLINIC | Age: 43
End: 2023-09-20
Payer: COMMERCIAL

## 2023-09-20 ENCOUNTER — HOSPITAL ENCOUNTER (OUTPATIENT)
Dept: RADIOLOGY | Facility: HOSPITAL | Age: 43
Discharge: HOME | End: 2023-09-20
Attending: FAMILY MEDICINE
Payer: COMMERCIAL

## 2023-09-20 VITALS
HEIGHT: 74 IN | BODY MASS INDEX: 30.29 KG/M2 | DIASTOLIC BLOOD PRESSURE: 76 MMHG | TEMPERATURE: 98 F | WEIGHT: 236 LBS | HEART RATE: 65 BPM | OXYGEN SATURATION: 99 % | RESPIRATION RATE: 16 BRPM | SYSTOLIC BLOOD PRESSURE: 115 MMHG

## 2023-09-20 DIAGNOSIS — M54.6 CHRONIC MIDLINE THORACIC BACK PAIN: ICD-10-CM

## 2023-09-20 DIAGNOSIS — Z82.49 FAMILY HISTORY OF EARLY CAD: ICD-10-CM

## 2023-09-20 DIAGNOSIS — N45.1 EPIDIDYMITIS: ICD-10-CM

## 2023-09-20 DIAGNOSIS — R35.0 URINARY FREQUENCY: ICD-10-CM

## 2023-09-20 DIAGNOSIS — M51.24 THORACIC DISC HERNIATION: ICD-10-CM

## 2023-09-20 DIAGNOSIS — R07.9 CHEST PAIN, UNSPECIFIED TYPE: ICD-10-CM

## 2023-09-20 DIAGNOSIS — G89.29 CHRONIC MIDLINE THORACIC BACK PAIN: ICD-10-CM

## 2023-09-20 DIAGNOSIS — K75.81 NASH (NONALCOHOLIC STEATOHEPATITIS): ICD-10-CM

## 2023-09-20 DIAGNOSIS — I10 ESSENTIAL HYPERTENSION: ICD-10-CM

## 2023-09-20 DIAGNOSIS — E78.1 HYPERTRIGLYCERIDEMIA: ICD-10-CM

## 2023-09-20 DIAGNOSIS — Z00.00 ANNUAL PHYSICAL EXAM: Primary | ICD-10-CM

## 2023-09-20 LAB
BACTERIA #/AREA URNS HPF: ABNORMAL /HPF
BILIRUB UR QL STRIP: NEGATIVE
CAOX CRY URNS QL MICRO: ABNORMAL
CLARITY UR: CLEAR
COLOR UR: YELLOW
GLUCOSE UR QL STRIP: NEGATIVE
HGB UR QL STRIP: ABNORMAL
KETONES UR QL STRIP: NEGATIVE
LEUKOCYTE ESTERASE UR QL STRIP: ABNORMAL
MICROSCOPIC COMMENT: ABNORMAL
NITRITE UR QL STRIP: NEGATIVE
PH UR STRIP: 6 [PH] (ref 5–8)
PROT UR QL STRIP: ABNORMAL
RBC #/AREA URNS HPF: 0 /HPF (ref 0–4)
SP GR UR STRIP: 1.03 (ref 1–1.03)
SQUAMOUS #/AREA URNS HPF: 1 /HPF
URN SPEC COLLECT METH UR: ABNORMAL
UROBILINOGEN UR STRIP-ACNC: NEGATIVE EU/DL
WBC #/AREA URNS HPF: 3 /HPF (ref 0–5)

## 2023-09-20 PROCEDURE — 71046 X-RAY EXAM CHEST 2 VIEWS: CPT | Mod: 26,,, | Performed by: RADIOLOGY

## 2023-09-20 PROCEDURE — 3008F PR BODY MASS INDEX (BMI) DOCUMENTED: ICD-10-PCS | Mod: CPTII,S$GLB,, | Performed by: FAMILY MEDICINE

## 2023-09-20 PROCEDURE — 4010F ACE/ARB THERAPY RXD/TAKEN: CPT | Mod: CPTII,S$GLB,, | Performed by: FAMILY MEDICINE

## 2023-09-20 PROCEDURE — 3078F DIAST BP <80 MM HG: CPT | Mod: CPTII,S$GLB,, | Performed by: FAMILY MEDICINE

## 2023-09-20 PROCEDURE — 81000 URINALYSIS NONAUTO W/SCOPE: CPT | Mod: PO | Performed by: FAMILY MEDICINE

## 2023-09-20 PROCEDURE — 99213 OFFICE O/P EST LOW 20 MIN: CPT | Mod: 25,S$GLB,, | Performed by: FAMILY MEDICINE

## 2023-09-20 PROCEDURE — 4010F PR ACE/ARB THEARPY RXD/TAKEN: ICD-10-PCS | Mod: CPTII,S$GLB,, | Performed by: FAMILY MEDICINE

## 2023-09-20 PROCEDURE — 99396 PREV VISIT EST AGE 40-64: CPT | Mod: S$GLB,,, | Performed by: FAMILY MEDICINE

## 2023-09-20 PROCEDURE — 99999 PR PBB SHADOW E&M-EST. PATIENT-LVL IV: ICD-10-PCS | Mod: PBBFAC,,, | Performed by: FAMILY MEDICINE

## 2023-09-20 PROCEDURE — 71046 XR CHEST PA AND LATERAL: ICD-10-PCS | Mod: 26,,, | Performed by: RADIOLOGY

## 2023-09-20 PROCEDURE — 99999 PR PBB SHADOW E&M-EST. PATIENT-LVL IV: CPT | Mod: PBBFAC,,, | Performed by: FAMILY MEDICINE

## 2023-09-20 PROCEDURE — 71046 X-RAY EXAM CHEST 2 VIEWS: CPT | Mod: TC,PO

## 2023-09-20 PROCEDURE — 99213 PR OFFICE/OUTPT VISIT, EST, LEVL III, 20-29 MIN: ICD-10-PCS | Mod: 25,S$GLB,, | Performed by: FAMILY MEDICINE

## 2023-09-20 PROCEDURE — 3074F PR MOST RECENT SYSTOLIC BLOOD PRESSURE < 130 MM HG: ICD-10-PCS | Mod: CPTII,S$GLB,, | Performed by: FAMILY MEDICINE

## 2023-09-20 PROCEDURE — 3074F SYST BP LT 130 MM HG: CPT | Mod: CPTII,S$GLB,, | Performed by: FAMILY MEDICINE

## 2023-09-20 PROCEDURE — 1159F PR MEDICATION LIST DOCUMENTED IN MEDICAL RECORD: ICD-10-PCS | Mod: CPTII,S$GLB,, | Performed by: FAMILY MEDICINE

## 2023-09-20 PROCEDURE — 1159F MED LIST DOCD IN RCRD: CPT | Mod: CPTII,S$GLB,, | Performed by: FAMILY MEDICINE

## 2023-09-20 PROCEDURE — 3008F BODY MASS INDEX DOCD: CPT | Mod: CPTII,S$GLB,, | Performed by: FAMILY MEDICINE

## 2023-09-20 PROCEDURE — 3078F PR MOST RECENT DIASTOLIC BLOOD PRESSURE < 80 MM HG: ICD-10-PCS | Mod: CPTII,S$GLB,, | Performed by: FAMILY MEDICINE

## 2023-09-20 PROCEDURE — 99396 PR PREVENTIVE VISIT,EST,40-64: ICD-10-PCS | Mod: S$GLB,,, | Performed by: FAMILY MEDICINE

## 2023-09-20 RX ORDER — TIZANIDINE 4 MG/1
TABLET ORAL
Qty: 90 TABLET | Refills: 2 | Status: SHIPPED | OUTPATIENT
Start: 2023-09-20

## 2023-09-20 RX ORDER — CIPROFLOXACIN 500 MG/1
500 TABLET ORAL 2 TIMES DAILY
Qty: 42 TABLET | Refills: 0 | Status: SHIPPED | OUTPATIENT
Start: 2023-09-20 | End: 2023-10-11

## 2023-09-20 RX ORDER — CLOTRIMAZOLE AND BETAMETHASONE DIPROPIONATE 10; .64 MG/G; MG/G
CREAM TOPICAL 2 TIMES DAILY
Qty: 90 G | Refills: 1 | Status: SHIPPED | OUTPATIENT
Start: 2023-09-20

## 2023-09-20 RX ORDER — BENAZEPRIL HYDROCHLORIDE 10 MG/1
10 TABLET ORAL DAILY
Qty: 90 TABLET | Refills: 3 | Status: SHIPPED | OUTPATIENT
Start: 2023-09-20

## 2023-09-20 NOTE — Clinical Note
lab draw of labs ordered today, Arrange imaging ordered today.  Arrange the stress echo and the chest x-ray.

## 2023-09-20 NOTE — PROGRESS NOTES
"Subjective:      Patient ID: Kofi Damian is a 43 y.o. male.    Chief Complaint: Annual Exam  However, the patient does have 2 acute problems that he wanted addressed today.  Problem List Items Addressed This Visit       Chest pain    Relevant Orders    X-Ray Chest PA And Lateral    Stress Echo Which stress agent will be used? Treadmill Exercise; Color Flow Doppler? No    Essential hypertension    Overview     The patient presents with essential hypertension.  The patient is tolerating the medication well and is in excellent compliance.  The patient is experiencing no side effects.  Counseling was offered regarding low salt diets.  The patient has a reduced salt intake.  The patient denies chest pain, palpitations, shortness of breath, dyspnea on exertion, left or murmur neck pain, nausea, vomiting, diaphoresis, paroxysmal nocturnal dyspnea, and orthopnea.   /74   Pulse 63   Temp 98 °F (36.7 °C) (Oral)   Ht 6' 2" (1.88 m)   Wt 101.5 kg (223 lb 10.5 oz)   BMI 28.72 kg/m²   He is on lotensin for this now.         Relevant Medications    benazepriL (LOTENSIN) 10 MG tablet    Other Relevant Orders    Comprehensive Metabolic Panel    Family history of early CAD    Overview     His father  at the age of 48 from an MI         Relevant Orders    Stress Echo Which stress agent will be used? Treadmill Exercise; Color Flow Doppler? No    Hypertriglyceridemia    Overview     The patient presents with hyperlipidemia.    He is not on medicine for this now.  Lab Results   Component Value Date    CHOL 153 2022    CHOL 150 2021    CHOL 160 2020       Lab Results   Component Value Date    HDL 39 (L) 2022    HDL 28 (L) 2021    HDL 34 (L) 2020       Lab Results   Component Value Date    LDLCALC 102.6 2022    LDLCALC 102.2 2021    LDLCALC 107.2 2020       Lab Results   Component Value Date    TRIG 57 2022    TRIG 99 2021    TRIG 94 2020       Lab " Results   Component Value Date    CHOLHDL 25.5 09/02/2022    CHOLHDL 18.7 (L) 07/30/2021    CHOLHDL 21.3 08/05/2020     Lab Results   Component Value Date    ALT 27 09/02/2022    AST 28 09/02/2022    ALKPHOS 62 09/02/2022    BILITOT 0.7 09/02/2022            Relevant Orders    Lipid Panel    SOLORZANO (nonalcoholic steatohepatitis)    Overview     He has SOLORZANO and he has had liver functions done in the past.   He has lost weight to help with this issue.    The last numbers are below:  Lab Results   Component Value Date    ALT 27 09/02/2022    AST 28 09/02/2022    ALKPHOS 62 09/02/2022    BILITOT 0.7 09/02/2022              Relevant Orders    Comprehensive Metabolic Panel    Thoracic disc herniation    Overview     He has a thoracic disc herniation and he has been treated medically.  He had been evaluated by Dr. Quiñonez.  Gabapentin was not helping that much and he had dizziness so he has stopped it.  Flexeril causes him to feel hung over and he is using tizanidine for prn use.  Historically he had pain on the right side but it has been spreading now to the left for the last 6 months.  He had an MRI years ago. He has been dieting and he has been working out in the gym.  He states that he has been lifting weights.      MRI Thoracic Spine Without Contrast  Order: [id number]  Status: Final result     Visible to patient: Yes (seen)     Next appt: None     Dx: Thoracic disc herniation     0 Result Notes  Details    Reading Physician Reading Date Result Priority   Lavern Khan MD  [phone]  [phone] 7/11/2017    Lilli Prater MD  [phone] 7/11/2017      Narrative & Impression     Comparison: 10/26/2016, CT liver biopsy on 10/17/2016     Technique: Multiplanar MR imaging of the thoracic spine was performed utilizing sagittal T1, T2, and STIR, and axial T2 pulse sequences.     Findings:     The thoracic spine demonstrates proper alignment. The vertebral bodies show normal signal intensity and height with no  indication of acute fracture or pathologic marrow replacement process. The intervertebral disk spaces also appear well-maintained. There is a left paracentral posterior osteophyte at the level of T7-T8 abutting the left lateral cord, similar to prior exam without spinal canal stenosis or neural foraminal narrowing.  There is a small focal right paracentral disc protrusion at the level of T8-T9 and T12-L1 without spinal canal stenosis or neural foraminal narrowing.     The demonstrated portion of the spinal cord is normal in signal intensity at all levels with no indication of myelomalacia or cord edema.      No intradural signal abnormalities are apparent.      Evaluation of the surrounding soft tissue structures are unremarkable.  IMPRESSION:         Stable left paracentral posterior osteophyte at the level of T7-T8 abutting the left lateral cord without spinal canal stenosis.  ______________________________________      Electronically signed by resident: CAROL GORMAN MD  Date:                                            07/11/17  Time:                                           13:53                 As the supervising and teaching physician, I personally reviewed the images and resident's interpretation and I agree with the findings.                 Electronically signed by: RUSLAN ROLLE MD  Date:                                            07/11/17  Time:                                           14:12               Relevant Medications    tiZANidine (ZANAFLEX) 4 MG tablet     Other Visit Diagnoses       Annual physical exam    -  Primary    Chronic midline thoracic back pain        Relevant Medications    tiZANidine (ZANAFLEX) 4 MG tablet    Epididymitis        Relevant Medications    ciprofloxacin HCl (CIPRO) 500 MG tablet    Urinary frequency        Relevant Orders    Urinalysis, Reflex to Urine Culture Urine, Clean Catch          He has been having chest pain on the left side of his chest that can occur  "with rest and he states that he gets short of breath with it when he gets it.  He has not had nausea or left arm or neck pain with this.  He used to work out until he graduated and does not have access to the gym.  This chest pain has been going on for last several months.  He reports that his family has significant history of heart disease with his father dying in his 40s of coronary artery disease.    The patient has also had nocturia associated with increased urinary urgency and incomplete bladder emptying.  He has had left testicle pain.  This has been going on for some months.  No trauma or any unusual sexual activity.        The patient's Health Maintenance was reviewed and the following appears to be due:   Health Maintenance Due   Topic Date Due    Hemoglobin A1c (Diabetic Prevention Screening)  Never done    COVID-19 Vaccine (4 - Pfizer series) 2022    Influenza Vaccine (1) 2023       Past Medical History:  Past Medical History:   Diagnosis Date    Back pain     Facet arthropathy, lumbosacral     Family history of early CAD 8/10/2016    His father  at the age of 48 from an MI    L4-L5 disc bulge      Past Surgical History:   Procedure Laterality Date    COLONOSCOPY N/A 2016    Procedure: COLONOSCOPY;  Surgeon: Óscar Kelly MD;  Location: [facility];  Service: Endoscopy;  Laterality: N/A;    LIPOMA RESECTION      WRIST SURGERY       Review of patient's allergies indicates:   Allergen Reactions    Meperidine Rash and Hives     Makes arms "turn red"    Cymbalta [duloxetine] Other (See Comments)     Made him shake, not sleep and have suicidal thoughts.     Current Outpatient Medications on File Prior to Visit   Medication Sig Dispense Refill    [DISCONTINUED] benazepriL (LOTENSIN) 10 MG tablet TAKE 1 TABLET BY MOUTH EVERY DAY 90 tablet 0    [DISCONTINUED] clotrimazole-betamethasone 1-0.05% (LOTRISONE) cream Apply topically 2 (two) times daily. Use 1 g per dose bid 90 g 1 "    [DISCONTINUED] tiZANidine (ZANAFLEX) 4 MG tablet TAKE 1 TABLET BY MOUTH EVERY EVENING AS NEEDED FOR MUSCLE SPASMS 90 tablet 0     No current facility-administered medications on file prior to visit.     Social History     Socioeconomic History    Marital status:    Occupational History     Employer: [employer]   Tobacco Use    Smoking status: Former     Current packs/day: 0.00     Average packs/day: 1 pack/day for 15.0 years (15.0 ttl pk-yrs)     Types: Cigarettes     Start date: 2013     Quit date: 2021     Years since quittin.7    Smokeless tobacco: Former   Substance and Sexual Activity    Alcohol use: Yes     Comment: stopped 2016    Drug use: No    Sexual activity: Yes     Partners: Female     Social Determinants of Health     Financial Resource Strain: Low Risk  (2023)    Overall Financial Resource Strain (CARDIA)     Difficulty of Paying Living Expenses: Not very hard   Food Insecurity: No Food Insecurity (2023)    Hunger Vital Sign     Worried About Running Out of Food in the Last Year: Never true     Ran Out of Food in the Last Year: Never true   Transportation Needs: No Transportation Needs (2023)    PRAPARE - Transportation     Lack of Transportation (Medical): No     Lack of Transportation (Non-Medical): No   Physical Activity: Unknown (2023)    Exercise Vital Sign     Days of Exercise per Week: 0 days   Stress: No Stress Concern Present (2023)    [place] [employer] - Occupational Stress Questionnaire     Feeling of Stress : Not at all   Social Connections: Unknown (2023)    Social Connection and Isolation Panel [NHANES]     Frequency of Communication with Friends and Family: Once a week     Frequency of Social Gatherings with Friends and Family: Once a week     Active Member of Clubs or Organizations: No     Attends Club or Organization Meetings: Never     Marital Status:    Housing Stability: Low  "Risk  (9/13/2023)    Housing Stability Vital Sign     Unable to Pay for Housing in the Last Year: No     Number of Places Lived in the Last Year: 1     Unstable Housing in the Last Year: No     Family History   Problem Relation Age of Onset    Hypertension Mother     Heart disease Father        Review of Systems   Constitutional:  Negative for activity change and unexpected weight change.   HENT:  Negative for hearing loss, rhinorrhea and trouble swallowing.    Eyes:  Negative for discharge and visual disturbance.   Respiratory:  Positive for shortness of breath. Negative for cough, chest tightness and wheezing.    Cardiovascular:  Positive for chest pain. Negative for palpitations.   Gastrointestinal:  Negative for blood in stool, constipation, diarrhea and vomiting.   Endocrine: Negative for polydipsia and polyuria.   Genitourinary:  Positive for frequency and testicular pain. Negative for difficulty urinating, dysuria, enuresis, flank pain, hematuria, penile discharge, penile pain, penile swelling and urgency.   Musculoskeletal:  Negative for arthralgias, joint swelling and neck pain.   Neurological:  Negative for weakness and headaches.   Psychiatric/Behavioral:  Negative for confusion and dysphoric mood.      Objective:   /76 (BP Location: Left arm, Patient Position: Sitting, BP Method: Medium (Automatic))   Pulse 65   Temp 97.8 °F (36.6 °C)   Resp 16   Ht 6' 2" (1.88 m)   Wt 107 kg (236 lb)   SpO2 99%   BMI 30.30 kg/m²     Physical Exam  Vitals reviewed.   Constitutional:       General: He is not in acute distress.     Appearance: Normal appearance. He is well-developed. He is not ill-appearing or diaphoretic.   HENT:      Head: Normocephalic and atraumatic.      Right Ear: Tympanic membrane, ear canal and external ear normal.      Left Ear: Tympanic membrane, ear canal and external ear normal.      Nose: Nose normal.      Mouth/Throat:      Pharynx: No oropharyngeal exudate.   Eyes:      " General: No scleral icterus.        Right eye: No discharge.         Left eye: No discharge.      Conjunctiva/sclera: Conjunctivae normal.      Pupils: Pupils are equal, round, and reactive to light.   Neck:      Thyroid: No thyromegaly.      Vascular: No JVD.   Cardiovascular:      Rate and Rhythm: Normal rate and regular rhythm.      Heart sounds: Normal heart sounds. No murmur heard.     No friction rub. No gallop.   Pulmonary:      Effort: Pulmonary effort is normal. No respiratory distress.      Breath sounds: Normal breath sounds. No wheezing or rales.   Chest:      Chest wall: No tenderness.   Abdominal:      General: Bowel sounds are normal. There is no distension.      Palpations: Abdomen is soft. There is no mass.      Tenderness: There is no abdominal tenderness. There is no guarding or rebound.   Genitourinary:         Comments: Prostate is without masses or swelling and no pain on palpation.  Musculoskeletal:         General: No tenderness. Normal range of motion.      Cervical back: Normal range of motion and neck supple.   Lymphadenopathy:      Cervical: No cervical adenopathy.   Skin:     General: Skin is warm and dry.   Neurological:      Mental Status: He is alert and oriented to person, place, and time.      Cranial Nerves: No cranial nerve deficit.      Coordination: Coordination normal.   Assessment:     1. Annual physical exam    2. Essential hypertension    3. Family history of early CAD    4. Hypertriglyceridemia    5. SOLORZANO (nonalcoholic steatohepatitis)    6. Thoracic disc herniation    7. Chronic midline thoracic back pain    8. Epididymitis    9. Urinary frequency    10. Chest pain, unspecified type      Plan:   I have changed Ryan Damian's benazepriL. I am also having him start on ciprofloxacin HCl. Additionally, I am having him maintain his tiZANidine and clotrimazole-betamethasone 1-0.05%.  No problem-specific Assessment & Plan notes found for this encounter.      No follow-ups on  mirna Kirby was seen today for annual exam.    Diagnoses and all orders for this visit:    Annual physical exam    Essential hypertension  -     Comprehensive Metabolic Panel; Future  -     benazepriL (LOTENSIN) 10 MG tablet; Take 1 tablet (10 mg total) by mouth once daily.    Family history of early CAD  -     Stress Echo Which stress agent will be used? Treadmill Exercise; Color Flow Doppler? No; Future    Hypertriglyceridemia  -     Lipid Panel; Future    SOLORZANO (nonalcoholic steatohepatitis)  -     Comprehensive Metabolic Panel; Future    Thoracic disc herniation  -     tiZANidine (ZANAFLEX) 4 MG tablet; TAKE 1 TABLET BY MOUTH EVERY EVENING AS NEEDED FOR MUSCLE SPASMS    Chronic midline thoracic back pain  -     tiZANidine (ZANAFLEX) 4 MG tablet; TAKE 1 TABLET BY MOUTH EVERY EVENING AS NEEDED FOR MUSCLE SPASMS    Epididymitis  -     ciprofloxacin HCl (CIPRO) 500 MG tablet; Take 1 tablet (500 mg total) by mouth 2 (two) times daily. for 21 days    Urinary frequency  -     Urinalysis, Reflex to Urine Culture Urine, Clean Catch    Chest pain, unspecified type  -     X-Ray Chest PA And Lateral; Future  -     Stress Echo Which stress agent will be used? Treadmill Exercise; Color Flow Doppler? No; Future    Other orders  -     clotrimazole-betamethasone 1-0.05% (LOTRISONE) cream; Apply topically 2 (two) times daily. Use 1 g per dose bid      Medications Ordered This Encounter   Medications    benazepriL (LOTENSIN) 10 MG tablet     Sig: Take 1 tablet (10 mg total) by mouth once daily.     Dispense:  90 tablet     Refill:  3     .    ciprofloxacin HCl (CIPRO) 500 MG tablet     Sig: Take 1 tablet (500 mg total) by mouth 2 (two) times daily. for 21 days     Dispense:  42 tablet     Refill:  0    clotrimazole-betamethasone 1-0.05% (LOTRISONE) cream     Sig: Apply topically 2 (two) times daily. Use 1 g per dose bid     Dispense:  90 g     Refill:  1    tiZANidine (ZANAFLEX) 4 MG tablet     Sig: TAKE 1 TABLET BY MOUTH  EVERY EVENING AS NEEDED FOR MUSCLE SPASMS     Dispense:  90 tablet     Refill:  2     The patient was instructed to stop the following meds:  Medications Discontinued During This Encounter   Medication Reason    clotrimazole-betamethasone 1-0.05% (LOTRISONE) cream Reorder    tiZANidine (ZANAFLEX) 4 MG tablet Reorder    benazepriL (LOTENSIN) 10 MG tablet Reorder     Orders Placed This Encounter   Procedures    X-Ray Chest PA And Lateral     Standing Status:   Future     Standing Expiration Date:   9/20/2024    Comprehensive Metabolic Panel     Standing Status:   Future     Standing Expiration Date:   9/19/2024    Lipid Panel     Standing Status:   Future     Standing Expiration Date:   9/19/2024    Urinalysis, Reflex to Urine Culture Urine, Clean Catch     Order Specific Question:   Preferred Collection Type     Answer:   Urine, Clean Catch     Order Specific Question:   Specimen Source     Answer:   Urine    Stress Echo Which stress agent will be used? Treadmill Exercise; Color Flow Doppler? No     Standing Status:   Future     Standing Expiration Date:   9/20/2024     Order Specific Question:   Which stress agent will be used?     Answer:   Treadmill Exercise     Order Specific Question:   Color Flow Doppler?     Answer:   No     Order Specific Question:   Release to patient     Answer:   Immediate       Medication List with Changes/Refills   New Medications    CIPROFLOXACIN HCL (CIPRO) 500 MG TABLET    Take 1 tablet (500 mg total) by mouth 2 (two) times daily. for 21 days   Changed and/or Refilled Medications    Modified Medication Previous Medication    BENAZEPRIL (LOTENSIN) 10 MG TABLET benazepriL (LOTENSIN) 10 MG tablet       Take 1 tablet (10 mg total) by mouth once daily.    TAKE 1 TABLET BY MOUTH EVERY DAY    CLOTRIMAZOLE-BETAMETHASONE 1-0.05% (LOTRISONE) CREAM clotrimazole-betamethasone 1-0.05% (LOTRISONE) cream       Apply topically 2 (two) times daily. Use 1 g per dose bid    Apply topically 2  (two) times daily. Use 1 g per dose bid    TIZANIDINE (ZANAFLEX) 4 MG TABLET tiZANidine (ZANAFLEX) 4 MG tablet       TAKE 1 TABLET BY MOUTH EVERY EVENING AS NEEDED FOR MUSCLE SPASMS    TAKE 1 TABLET BY MOUTH EVERY EVENING AS NEEDED FOR MUSCLE SPASMS      Medication List with Changes/Refills   New Medications    CIPROFLOXACIN HCL (CIPRO) 500 MG TABLET    Take 1 tablet (500 mg total) by mouth 2 (two) times daily. for 21 days       Start Date: 9/20/2023 End Date: 10/11/2023   Changed and/or Refilled Medications    Modified Medication Previous Medication    BENAZEPRIL (LOTENSIN) 10 MG TABLET benazepriL (LOTENSIN) 10 MG tablet       Take 1 tablet (10 mg total) by mouth once daily.    TAKE 1 TABLET BY MOUTH EVERY DAY       Start Date: 9/20/2023 End Date: --    Start Date: 9/5/2023  End Date: 9/20/2023    CLOTRIMAZOLE-BETAMETHASONE 1-0.05% (LOTRISONE) CREAM clotrimazole-betamethasone 1-0.05% (LOTRISONE) cream       Apply topically 2 (two) times daily. Use 1 g per dose bid    Apply topically 2 (two) times daily. Use 1 g per dose bid       Start Date: 9/20/2023 End Date: --    Start Date: 8/31/2022 End Date: 9/20/2023    TIZANIDINE (ZANAFLEX) 4 MG TABLET tiZANidine (ZANAFLEX) 4 MG tablet       TAKE 1 TABLET BY MOUTH EVERY EVENING AS NEEDED FOR MUSCLE SPASMS    TAKE 1 TABLET BY MOUTH EVERY EVENING AS NEEDED FOR MUSCLE SPASMS       Start Date: 9/20/2023 End Date: --    Start Date: 6/5/2023  End Date: 9/20/2023

## 2023-09-21 ENCOUNTER — LAB VISIT (OUTPATIENT)
Dept: LAB | Facility: HOSPITAL | Age: 43
End: 2023-09-21
Attending: FAMILY MEDICINE
Payer: COMMERCIAL

## 2023-09-21 DIAGNOSIS — E78.1 HYPERTRIGLYCERIDEMIA: ICD-10-CM

## 2023-09-21 DIAGNOSIS — I10 ESSENTIAL HYPERTENSION: ICD-10-CM

## 2023-09-21 DIAGNOSIS — K75.81 NASH (NONALCOHOLIC STEATOHEPATITIS): ICD-10-CM

## 2023-09-21 LAB
ALBUMIN SERPL BCP-MCNC: 4.4 G/DL (ref 3.5–5.2)
ALP SERPL-CCNC: 64 U/L (ref 55–135)
ALT SERPL W/O P-5'-P-CCNC: 29 U/L (ref 10–44)
ANION GAP SERPL CALC-SCNC: 6 MMOL/L (ref 8–16)
AST SERPL-CCNC: 16 U/L (ref 10–40)
BILIRUB SERPL-MCNC: 0.5 MG/DL (ref 0.1–1)
BUN SERPL-MCNC: 17 MG/DL (ref 6–20)
CALCIUM SERPL-MCNC: 9.7 MG/DL (ref 8.7–10.5)
CHLORIDE SERPL-SCNC: 109 MMOL/L (ref 95–110)
CHOLEST SERPL-MCNC: 172 MG/DL (ref 120–199)
CHOLEST/HDLC SERPL: 5.4 {RATIO} (ref 2–5)
CO2 SERPL-SCNC: 25 MMOL/L (ref 23–29)
CREAT SERPL-MCNC: 1 MG/DL (ref 0.5–1.4)
EST. GFR  (NO RACE VARIABLE): >60 ML/MIN/1.73 M^2
GLUCOSE SERPL-MCNC: 89 MG/DL (ref 70–110)
HDLC SERPL-MCNC: 32 MG/DL (ref 40–75)
HDLC SERPL: 18.6 % (ref 20–50)
LDLC SERPL CALC-MCNC: 114.4 MG/DL (ref 63–159)
NONHDLC SERPL-MCNC: 140 MG/DL
POTASSIUM SERPL-SCNC: 4.4 MMOL/L (ref 3.5–5.1)
PROT SERPL-MCNC: 7.6 G/DL (ref 6–8.4)
SODIUM SERPL-SCNC: 140 MMOL/L (ref 136–145)
TRIGL SERPL-MCNC: 128 MG/DL (ref 30–150)

## 2023-09-21 PROCEDURE — 80053 COMPREHEN METABOLIC PANEL: CPT | Performed by: FAMILY MEDICINE

## 2023-09-21 PROCEDURE — 36415 COLL VENOUS BLD VENIPUNCTURE: CPT | Mod: PO | Performed by: FAMILY MEDICINE

## 2023-09-21 PROCEDURE — 80061 LIPID PANEL: CPT | Performed by: FAMILY MEDICINE

## 2024-06-19 ENCOUNTER — PATIENT MESSAGE (OUTPATIENT)
Dept: FAMILY MEDICINE | Facility: CLINIC | Age: 44
End: 2024-06-19
Payer: COMMERCIAL

## 2024-06-24 ENCOUNTER — LAB VISIT (OUTPATIENT)
Dept: LAB | Facility: HOSPITAL | Age: 44
End: 2024-06-24
Attending: NURSE PRACTITIONER
Payer: COMMERCIAL

## 2024-06-24 ENCOUNTER — OFFICE VISIT (OUTPATIENT)
Dept: FAMILY MEDICINE | Facility: CLINIC | Age: 44
End: 2024-06-24
Payer: COMMERCIAL

## 2024-06-24 VITALS
RESPIRATION RATE: 18 BRPM | SYSTOLIC BLOOD PRESSURE: 130 MMHG | WEIGHT: 244.31 LBS | HEIGHT: 74 IN | DIASTOLIC BLOOD PRESSURE: 89 MMHG | HEART RATE: 62 BPM | BODY MASS INDEX: 31.35 KG/M2

## 2024-06-24 DIAGNOSIS — R39.11 URINARY HESITANCY: Primary | ICD-10-CM

## 2024-06-24 DIAGNOSIS — M25.50 ARTHRALGIA, UNSPECIFIED JOINT: ICD-10-CM

## 2024-06-24 DIAGNOSIS — D72.829 LEUKOCYTOSIS, UNSPECIFIED TYPE: ICD-10-CM

## 2024-06-24 DIAGNOSIS — Z13.1 SCREENING FOR DIABETES MELLITUS: ICD-10-CM

## 2024-06-24 DIAGNOSIS — R76.8 ANA POSITIVE: ICD-10-CM

## 2024-06-24 DIAGNOSIS — G89.29 CHRONIC BILATERAL BACK PAIN, UNSPECIFIED BACK LOCATION: ICD-10-CM

## 2024-06-24 DIAGNOSIS — M54.9 CHRONIC BILATERAL BACK PAIN, UNSPECIFIED BACK LOCATION: ICD-10-CM

## 2024-06-24 LAB
ALBUMIN SERPL BCP-MCNC: 4.4 G/DL (ref 3.5–5.2)
ALP SERPL-CCNC: 79 U/L (ref 55–135)
ALT SERPL W/O P-5'-P-CCNC: 34 U/L (ref 10–44)
ANION GAP SERPL CALC-SCNC: 14 MMOL/L (ref 8–16)
AST SERPL-CCNC: 22 U/L (ref 10–40)
BASOPHILS # BLD AUTO: 0.12 K/UL (ref 0–0.2)
BASOPHILS NFR BLD: 0.9 % (ref 0–1.9)
BILIRUB SERPL-MCNC: 0.5 MG/DL (ref 0.1–1)
BUN SERPL-MCNC: 18 MG/DL (ref 6–20)
CALCIUM SERPL-MCNC: 10.3 MG/DL (ref 8.7–10.5)
CHLORIDE SERPL-SCNC: 106 MMOL/L (ref 95–110)
CO2 SERPL-SCNC: 20 MMOL/L (ref 23–29)
CREAT SERPL-MCNC: 1 MG/DL (ref 0.5–1.4)
CRP SERPL-MCNC: 11.2 MG/L (ref 0–8.2)
DIFFERENTIAL METHOD BLD: ABNORMAL
EOSINOPHIL # BLD AUTO: 0.2 K/UL (ref 0–0.5)
EOSINOPHIL NFR BLD: 1.6 % (ref 0–8)
ERYTHROCYTE [DISTWIDTH] IN BLOOD BY AUTOMATED COUNT: 12.5 % (ref 11.5–14.5)
ERYTHROCYTE [SEDIMENTATION RATE] IN BLOOD BY PHOTOMETRIC METHOD: 12 MM/HR (ref 0–23)
EST. GFR  (NO RACE VARIABLE): >60 ML/MIN/1.73 M^2
ESTIMATED AVG GLUCOSE: 94 MG/DL (ref 68–131)
GLUCOSE SERPL-MCNC: 82 MG/DL (ref 70–110)
HBA1C MFR BLD: 4.9 % (ref 4–5.6)
HCT VFR BLD AUTO: 48.7 % (ref 40–54)
HGB BLD-MCNC: 15.3 G/DL (ref 14–18)
IMM GRANULOCYTES # BLD AUTO: 0.05 K/UL (ref 0–0.04)
IMM GRANULOCYTES NFR BLD AUTO: 0.4 % (ref 0–0.5)
LYMPHOCYTES # BLD AUTO: 3.7 K/UL (ref 1–4.8)
LYMPHOCYTES NFR BLD: 26.6 % (ref 18–48)
MCH RBC QN AUTO: 29.1 PG (ref 27–31)
MCHC RBC AUTO-ENTMCNC: 31.4 G/DL (ref 32–36)
MCV RBC AUTO: 93 FL (ref 82–98)
MONOCYTES # BLD AUTO: 0.9 K/UL (ref 0.3–1)
MONOCYTES NFR BLD: 6.2 % (ref 4–15)
NEUTROPHILS # BLD AUTO: 9 K/UL (ref 1.8–7.7)
NEUTROPHILS NFR BLD: 64.3 % (ref 38–73)
NRBC BLD-RTO: 0 /100 WBC
PLATELET # BLD AUTO: 284 K/UL (ref 150–450)
PMV BLD AUTO: 11.8 FL (ref 9.2–12.9)
POTASSIUM SERPL-SCNC: 4.5 MMOL/L (ref 3.5–5.1)
PROT SERPL-MCNC: 7.8 G/DL (ref 6–8.4)
RBC # BLD AUTO: 5.26 M/UL (ref 4.6–6.2)
SODIUM SERPL-SCNC: 140 MMOL/L (ref 136–145)
TSH SERPL DL<=0.005 MIU/L-ACNC: 1.03 UIU/ML (ref 0.4–4)
URATE SERPL-MCNC: 5.3 MG/DL (ref 3.4–7)
WBC # BLD AUTO: 14.01 K/UL (ref 3.9–12.7)

## 2024-06-24 PROCEDURE — 80053 COMPREHEN METABOLIC PANEL: CPT | Performed by: NURSE PRACTITIONER

## 2024-06-24 PROCEDURE — 36415 COLL VENOUS BLD VENIPUNCTURE: CPT | Mod: PO | Performed by: NURSE PRACTITIONER

## 2024-06-24 PROCEDURE — 86038 ANTINUCLEAR ANTIBODIES: CPT | Performed by: NURSE PRACTITIONER

## 2024-06-24 PROCEDURE — 84550 ASSAY OF BLOOD/URIC ACID: CPT | Performed by: NURSE PRACTITIONER

## 2024-06-24 PROCEDURE — 86431 RHEUMATOID FACTOR QUANT: CPT | Performed by: NURSE PRACTITIONER

## 2024-06-24 PROCEDURE — 86140 C-REACTIVE PROTEIN: CPT | Performed by: NURSE PRACTITIONER

## 2024-06-24 PROCEDURE — 86235 NUCLEAR ANTIGEN ANTIBODY: CPT | Mod: 59 | Performed by: NURSE PRACTITIONER

## 2024-06-24 PROCEDURE — 85025 COMPLETE CBC W/AUTO DIFF WBC: CPT | Performed by: NURSE PRACTITIONER

## 2024-06-24 PROCEDURE — 86039 ANTINUCLEAR ANTIBODIES (ANA): CPT | Performed by: NURSE PRACTITIONER

## 2024-06-24 PROCEDURE — 99999 PR PBB SHADOW E&M-EST. PATIENT-LVL V: CPT | Mod: PBBFAC,,, | Performed by: NURSE PRACTITIONER

## 2024-06-24 PROCEDURE — 85652 RBC SED RATE AUTOMATED: CPT | Performed by: NURSE PRACTITIONER

## 2024-06-24 PROCEDURE — 83036 HEMOGLOBIN GLYCOSYLATED A1C: CPT | Performed by: NURSE PRACTITIONER

## 2024-06-24 PROCEDURE — 84443 ASSAY THYROID STIM HORMONE: CPT | Performed by: NURSE PRACTITIONER

## 2024-06-24 PROCEDURE — 81001 URINALYSIS AUTO W/SCOPE: CPT | Performed by: NURSE PRACTITIONER

## 2024-06-24 PROCEDURE — 99214 OFFICE O/P EST MOD 30 MIN: CPT | Mod: S$GLB,,, | Performed by: NURSE PRACTITIONER

## 2024-06-24 NOTE — PATIENT INSTRUCTIONS
Reilly Kirby,     If you are due for any health screening(s) below please notify me so we can arrange them to be ordered and scheduled. Most healthy patients at your age complete them, but you are free to accept or refuse.     If you can't do it, I'll definitely understand. If you can, I'd certainly appreciate it!    All of your core healthy metrics are met.

## 2024-06-25 LAB
AMORPH CRY UR QL COMP ASSIST: NORMAL
ANA PATTERN 1: NORMAL
ANA PATTERN 2: NORMAL
ANA SER QL IF: POSITIVE
ANA TITER 2: NORMAL
ANA TITR SER IF: NORMAL {TITER}
BACTERIA #/AREA URNS AUTO: NORMAL /HPF
BILIRUB UR QL STRIP: NEGATIVE
CLARITY UR REFRACT.AUTO: CLEAR
COLOR UR AUTO: YELLOW
GLUCOSE UR QL STRIP: NEGATIVE
HGB UR QL STRIP: NEGATIVE
KETONES UR QL STRIP: NEGATIVE
LEUKOCYTE ESTERASE UR QL STRIP: ABNORMAL
MICROSCOPIC COMMENT: NORMAL
NITRITE UR QL STRIP: NEGATIVE
PH UR STRIP: 5 [PH] (ref 5–8)
PROT UR QL STRIP: NEGATIVE
RBC #/AREA URNS AUTO: 2 /HPF (ref 0–4)
RHEUMATOID FACT SERPL-ACNC: <13 IU/ML (ref 0–15)
SP GR UR STRIP: >=1.03 (ref 1–1.03)
SQUAMOUS #/AREA URNS AUTO: 0 /HPF
URN SPEC COLLECT METH UR: ABNORMAL
WBC #/AREA URNS AUTO: 4 /HPF (ref 0–5)

## 2024-06-27 LAB
ANTI SM ANTIBODY: 0.1 RATIO (ref 0–0.99)
ANTI SM/RNP ANTIBODY: 0.1 RATIO (ref 0–0.99)
ANTI-SM INTERPRETATION: NEGATIVE
ANTI-SM/RNP INTERPRETATION: NEGATIVE
ANTI-SSA ANTIBODY: 0.07 RATIO (ref 0–0.99)
ANTI-SSA INTERPRETATION: NEGATIVE
ANTI-SSB ANTIBODY: 0.08 RATIO (ref 0–0.99)
ANTI-SSB INTERPRETATION: NEGATIVE
DSDNA AB SER-ACNC: NORMAL [IU]/ML

## 2024-07-05 ENCOUNTER — OFFICE VISIT (OUTPATIENT)
Dept: UROLOGY | Facility: CLINIC | Age: 44
End: 2024-07-05
Payer: COMMERCIAL

## 2024-07-05 ENCOUNTER — HOSPITAL ENCOUNTER (OUTPATIENT)
Dept: RADIOLOGY | Facility: HOSPITAL | Age: 44
Discharge: HOME | End: 2024-07-05
Payer: COMMERCIAL

## 2024-07-05 VITALS — HEIGHT: 74 IN | WEIGHT: 246.25 LBS | BODY MASS INDEX: 31.6 KG/M2

## 2024-07-05 DIAGNOSIS — N50.812 LEFT TESTICULAR PAIN: ICD-10-CM

## 2024-07-05 DIAGNOSIS — R39.11 URINARY HESITANCY: Primary | ICD-10-CM

## 2024-07-05 LAB
BILIRUBIN, UA POC OHS: NEGATIVE
BLOOD, UA POC OHS: NEGATIVE
CLARITY, UA POC OHS: CLEAR
COLOR, UA POC OHS: YELLOW
GLUCOSE, UA POC OHS: NEGATIVE
KETONES, UA POC OHS: NEGATIVE
LEUKOCYTES, UA POC OHS: NEGATIVE
NITRITE, UA POC OHS: NEGATIVE
PH, UA POC OHS: 5.5
POC RESIDUAL URINE VOLUME: 0 ML (ref 0–100)
PROTEIN, UA POC OHS: NEGATIVE
SPECIFIC GRAVITY, UA POC OHS: >=1.03
UROBILINOGEN, UA POC OHS: 0.2

## 2024-07-05 PROCEDURE — 76870 US EXAM SCROTUM: CPT | Mod: TC,PO

## 2024-07-05 PROCEDURE — 93975 VASCULAR STUDY: CPT | Mod: TC,PO

## 2024-07-05 PROCEDURE — 51798 US URINE CAPACITY MEASURE: CPT | Mod: S$GLB,,,

## 2024-07-05 PROCEDURE — 99999 PR PBB SHADOW E&M-EST. PATIENT-LVL IV: CPT | Mod: PBBFAC,,,

## 2024-07-05 PROCEDURE — 87086 URINE CULTURE/COLONY COUNT: CPT

## 2024-07-05 PROCEDURE — 99204 OFFICE O/P NEW MOD 45 MIN: CPT | Mod: 25,S$GLB,,

## 2024-07-05 PROCEDURE — 93975 VASCULAR STUDY: CPT | Mod: 26,,, | Performed by: RADIOLOGY

## 2024-07-05 PROCEDURE — 76870 US EXAM SCROTUM: CPT | Mod: 26,,, | Performed by: RADIOLOGY

## 2024-07-05 PROCEDURE — 81003 URINALYSIS AUTO W/O SCOPE: CPT | Mod: QW,S$GLB,,

## 2024-07-05 NOTE — PROGRESS NOTES
"[facility] Note  Staff: RITU Park    PCP: MD Robin    Chief Complaint: Urinary Hesitancy    Subjective:        HPI: Kofi Damian is a 43 y.o. male NEW PATIENT presents today for evaluation of urinary hesitancy. He is accompanied by his wife. He states lately he has been experiencing a weak urinary stream and hesitancy. He states he has been waking up at least twice during the night to urinate. He denies dysuria, hematuria, fever, flank pain, abd pain, incontinence, and straining to urinate. He denies constipation. He denies a history of kidney stones.    We discussed a trial of an alpha blocker for his urinary symptoms. However, he also states he would like a semen analysis for infertility. He states they have actively been trying to conceive for about 6 months. We discussed most alpha blockers have a potential SE of retrograde ejaculation which would impair conceiving.    He also states he has been experiencing left testicular pain. He states he was seen by his PCP for this and given a 21 day course of cipro for presumed epididymitis.    Questions asked the pt during ov today:  Urgency: No, urge incontinence? No  NTF: 2-3x night  Dysuria: No  Gross Hematuria:No  Straining:No, Hesistancy:Yes, Intermittency:Yes, Weak stream:Yes     Last PSA Screening: No results found for: "PSA", "PSADIAG"    History of Kidney Stones?:  No    Constipation issues?:  No    PVR by bladder scan performed by MA today:  0 mL    REVIEW OF SYSTEMS:  Review of Systems   Constitutional: Negative.  Negative for chills and fever.   HENT: Negative.     Eyes: Negative.    Respiratory: Negative.     Cardiovascular: Negative.    Gastrointestinal: Negative.  Negative for abdominal pain, constipation, diarrhea, nausea and vomiting.   Genitourinary: Negative.  Negative for dysuria, flank pain, frequency, hematuria and urgency.   Musculoskeletal: Negative.  Negative for back pain.   Skin: Negative.  "   Neurological: Negative.    Endo/Heme/Allergies: Negative.    Psychiatric/Behavioral: Negative.         PMHx:  Past Medical History:   Diagnosis Date    Back pain     Facet arthropathy, lumbosacral     Family history of early CAD 8/10/2016    His father  at the age of 48 from an MI    L4-L5 disc bulge        PSHx:  Past Surgical History:   Procedure Laterality Date    COLONOSCOPY N/A 2016    Procedure: COLONOSCOPY;  Surgeon: Óscar Kelly MD;  Location: [facility];  Service: Endoscopy;  Laterality: N/A;    LIPOMA RESECTION      WRIST SURGERY         Fam Hx:   malignancies: No    kidney stones: No     Soc Hx:  , lives in [place]    Allergies:  Meperidine and Cymbalta [duloxetine]    Medications: reviewed     Objective:   There were no vitals filed for this visit.    Physical Exam  Constitutional:       Appearance: Normal appearance.   HENT:      Head: Normocephalic.      Mouth/Throat:      Mouth: Mucous membranes are moist.   Eyes:      Conjunctiva/sclera: Conjunctivae normal.   Pulmonary:      Effort: Pulmonary effort is normal.   Abdominal:      General: There is no distension.      Palpations: Abdomen is soft.      Tenderness: There is no abdominal tenderness.   Musculoskeletal:         General: Normal range of motion.      Cervical back: Normal range of motion.   Skin:     General: Skin is warm.   Neurological:      Mental Status: He is alert and oriented to person, place, and time.   Psychiatric:         Mood and Affect: Mood normal.         Behavior: Behavior normal.         LABS REVIEW:  UA today:  Color:Clear, Yellow  Spec. Grav.  >1.030  PH  5.5  Negative for leukocytes, nitrates, protein, glucose, ketones, urobili, bili, and blood.    Assessment:       1. Urinary hesitancy    2. Left testicular pain          Plan:     Urine sent for culture  US scrotum and testicles ordered and scheduled  Order sheet for semen analysis to be done through [facility] given to patient    F/u as needed per  treatment plan    [facility]: Active    RITU Park

## 2024-07-06 LAB — BACTERIA UR CULT: NORMAL

## 2024-07-08 ENCOUNTER — PATIENT MESSAGE (OUTPATIENT)
Dept: UROLOGY | Facility: CLINIC | Age: 44
End: 2024-07-08
Payer: COMMERCIAL

## 2024-07-08 NOTE — PROGRESS NOTES
Subjective:       Patient ID: Kofi Damian is a 43 y.o. male.    Chief Complaint: Arthritis and Male  Problem    Arthritis  Presents for follow-up visit. He complains of pain and stiffness. The symptoms have been worsening. Affected locations include the neck, left wrist, right wrist, right knee, left knee, left hip, right hip, left toes, right toes, left shoulder and right shoulder (back). His pain is at a severity of 4/10. Pertinent negatives include no diarrhea, dysuria, fatigue, fever or rash.   Male  Problem  This is a chronic problem. Associated symptoms include frequency, hesitancy, joint pain, joint swelling and urinary retention. Pertinent negatives include no abdominal pain, chest pain, coughing, diarrhea, dysuria, fever, headaches, nausea, rash, shortness of breath, sore throat or vomiting. Nothing aggravates the symptoms. He has tried nothing for the symptoms. He is sexually active. He never uses condoms. No, his partner does not have an STD.   He reports that he and his wife have been trying to have a baby for the last several months and have been unable to get pregnant. Neither he or his wife have children.     Review of Systems   Constitutional:  Negative for fatigue, fever and unexpected weight change.   HENT:  Negative for ear pain and sore throat.    Eyes: Negative.  Negative for pain and visual disturbance.   Respiratory:  Negative for cough and shortness of breath.    Cardiovascular:  Negative for chest pain and palpitations.   Gastrointestinal:  Negative for abdominal pain, diarrhea, nausea and vomiting.   Genitourinary:  Positive for difficulty urinating, frequency and hesitancy. Negative for dysuria.   Musculoskeletal:  Positive for arthralgias, arthritis, joint pain, myalgias and stiffness.   Skin:  Negative for color change and rash.   Neurological:  Negative for dizziness and headaches.   Psychiatric/Behavioral:  Negative for sleep disturbance. The patient is not nervous/anxious.         Vitals:    06/24/24 1542   BP: 130/89   Pulse: 62   Resp: 18       Objective:     Current Outpatient Medications   Medication Sig Dispense Refill    benazepriL (LOTENSIN) 10 MG tablet Take 1 tablet (10 mg total) by mouth once daily. 90 tablet 3    clobetasoL (TEMOVATE) 0.05 % external solution Apply to scalp twice a day x 2 weeks until improved, then D/C. Restart as needed. 50 mL 3    clotrimazole-betamethasone 1-0.05% (LOTRISONE) cream Apply topically 2 (two) times daily. Use 1 g per dose bid 90 g 1    hydrocortisone 2.5 % cream Apply topically 2 (two) times daily. Apply twice daily to affected area until improved, then can stop and restart as needed 28 g 2    ketoconazole (NIZORAL) 2 % cream Apply twice a day to affected areas of skin 60 g 2    ketoconazole (NIZORAL) 2 % shampoo Apply 1 application  topically 2 (two) times daily.      ketoconazole (NIZORAL) 2 % shampoo Apply topically twice a week. Apply 2-3 times a week. Lather, let sit 3-5 minutes, then rinse. 120 mL 5    mometasone (ELOCON) 0.1 % ointment Apply topically 2 (two) times a day. For 2 weeks for  flares to the face and ears. 45 g 1    tapinarof (VTAMA) 1 % Crea Apply 1 Application topically Daily. 60 g 3    tiZANidine (ZANAFLEX) 4 MG tablet TAKE 1 TABLET BY MOUTH EVERY EVENING AS NEEDED FOR MUSCLE SPASMS 90 tablet 2     No current facility-administered medications for this visit.       Physical Exam  Vitals and nursing note reviewed.   Constitutional:       General: He is not in acute distress.     Appearance: He is well-developed.   HENT:      Head: Normocephalic and atraumatic.   Eyes:      Pupils: Pupils are equal, round, and reactive to light.   Cardiovascular:      Rate and Rhythm: Normal rate and regular rhythm.   Pulmonary:      Effort: Pulmonary effort is normal.      Breath sounds: Normal breath sounds.   Musculoskeletal:         General: Normal range of motion.      Cervical back: Normal range of motion and neck supple.   Skin:      General: Skin is warm and dry.      Findings: No rash.   Neurological:      Mental Status: He is alert and oriented to person, place, and time.   Psychiatric:         Thought Content: Thought content normal.         Lab Results   Component Value Date    COLORU Yellow 07/05/2024    APPEARANCEUA Clear 06/24/2024    GLUCUA Negative 06/24/2024    SPECGRAV >=1.030 07/05/2024    PHUR 5.5 07/05/2024    WBCUR Negative 07/05/2024    NITRITE Negative 07/05/2024    KETONESU Negative 07/05/2024    BILIRUBINUA Negative 06/24/2024    UROBILINOGEN 0.2 07/05/2024    OCCULTUA Negative 06/24/2024    LEUKOCYTESUR Trace (A) 06/24/2024    GLUCOSEUR Negative 07/05/2024       Lab Results   Component Value Date    RBCUA 2 06/24/2024    WBCUA 4 06/24/2024    BACTERIA Rare 06/24/2024    SQUAMEPITHEL 0 06/24/2024    MICROCMT SEE COMMENT 06/24/2024       Assessment:       1. Urinary hesitancy    2. Chronic bilateral back pain, unspecified back location    3. Arthralgia, unspecified joint    4. Screening for diabetes mellitus        Plan:   Urinary hesitancy  -     Urinalysis, Reflex to Urine Culture Urine, Clean Catch  -     Ambulatory referral/consult to Urology; Future; Expected date: 07/01/2024    Chronic bilateral back pain, unspecified back location  -     Ambulatory referral/consult to Physical Medicine Rehab; Future; Expected date: 07/01/2024    Arthralgia, unspecified joint  -     Hemoglobin A1C; Future; Expected date: 06/24/2024  -     CBC Auto Differential; Future; Expected date: 06/24/2024  -     Comprehensive Metabolic Panel; Future; Expected date: 06/24/2024  -     TSH; Future; Expected date: 06/24/2024  -     Uric Acid; Future; Expected date: 06/24/2024  -     Sedimentation rate; Future; Expected date: 06/24/2024  -     Rheumatoid Factor; Future; Expected date: 06/24/2024  -     KATHLEEN Screen w/Reflex; Future; Expected date: 06/24/2024  -     C-REACTIVE PROTEIN; Future; Expected date: 06/24/2024    Screening for diabetes mellitus  -      Hemoglobin A1C; Future; Expected date: 06/24/2024    Other orders  -     Urinalysis Microscopic            Patient Instructions   Rielly Kirby,     If you are due for any health screening(s) below please notify me so we can arrange them to be ordered and scheduled. Most healthy patients at your age complete them, but you are free to accept or refuse.     If you can't do it, I'll definitely understand. If you can, I'd certainly appreciate it!    All of your core healthy metrics are met.

## 2024-07-09 ENCOUNTER — TELEPHONE (OUTPATIENT)
Dept: FAMILY MEDICINE | Facility: CLINIC | Age: 44
End: 2024-07-09
Payer: COMMERCIAL

## 2024-08-05 ENCOUNTER — PATIENT MESSAGE (OUTPATIENT)
Dept: UROLOGY | Facility: CLINIC | Age: 44
End: 2024-08-05
Payer: COMMERCIAL

## 2024-08-30 DIAGNOSIS — G89.29 CHRONIC MIDLINE THORACIC BACK PAIN: ICD-10-CM

## 2024-08-30 DIAGNOSIS — M54.6 CHRONIC MIDLINE THORACIC BACK PAIN: ICD-10-CM

## 2024-08-30 DIAGNOSIS — M51.24 THORACIC DISC HERNIATION: ICD-10-CM

## 2024-08-30 RX ORDER — TIZANIDINE 4 MG/1
TABLET ORAL
Qty: 90 TABLET | Refills: 2 | Status: SHIPPED | OUTPATIENT
Start: 2024-08-30

## 2024-08-30 NOTE — TELEPHONE ENCOUNTER
No care due was identified.  Health [facility] Embedded Care Due Messages. Reference number: [id number].   8/30/2024 6:09:12 AM CDT

## 2024-08-30 NOTE — TELEPHONE ENCOUNTER
Refill Routing Note   Medication(s) are not appropriate for processing by [facility] for the following reason(s):        Outside of protocol    [facility] action(s):  Route               Appointments  past 12m or future 3m with PCP    Date Provider   Last Visit   9/20/2023 Óscar Kelly MD   Next Visit   Visit date not found Óscar Kelly MD   ED visits in past 90 days: 0        Note composed:10:09 AM 08/30/2024

## 2024-09-11 ENCOUNTER — PATIENT MESSAGE (OUTPATIENT)
Dept: FAMILY MEDICINE | Facility: CLINIC | Age: 44
End: 2024-09-11
Payer: COMMERCIAL

## 2024-10-14 ENCOUNTER — HOSPITAL ENCOUNTER (OUTPATIENT)
Dept: RADIOLOGY | Facility: HOSPITAL | Age: 44
Discharge: HOME | End: 2024-10-14
Attending: NURSE PRACTITIONER
Payer: COMMERCIAL

## 2024-10-14 ENCOUNTER — OFFICE VISIT (OUTPATIENT)
Dept: FAMILY MEDICINE | Facility: CLINIC | Age: 44
End: 2024-10-14
Payer: COMMERCIAL

## 2024-10-14 VITALS
WEIGHT: 244.81 LBS | HEART RATE: 67 BPM | DIASTOLIC BLOOD PRESSURE: 89 MMHG | TEMPERATURE: 98 F | SYSTOLIC BLOOD PRESSURE: 138 MMHG | RESPIRATION RATE: 18 BRPM | HEIGHT: 74 IN | BODY MASS INDEX: 31.42 KG/M2 | OXYGEN SATURATION: 99 %

## 2024-10-14 DIAGNOSIS — R00.2 PALPITATIONS: ICD-10-CM

## 2024-10-14 DIAGNOSIS — R07.89 OTHER CHEST PAIN: Primary | ICD-10-CM

## 2024-10-14 DIAGNOSIS — Z23 IMMUNIZATION DUE: ICD-10-CM

## 2024-10-14 DIAGNOSIS — R07.89 OTHER CHEST PAIN: ICD-10-CM

## 2024-10-14 LAB
OHS QRS DURATION: 88 MS
OHS QTC CALCULATION: 389 MS

## 2024-10-14 PROCEDURE — 90471 IMMUNIZATION ADMIN: CPT | Mod: S$GLB,,, | Performed by: NURSE PRACTITIONER

## 2024-10-14 PROCEDURE — 71046 X-RAY EXAM CHEST 2 VIEWS: CPT | Mod: 26,,, | Performed by: RADIOLOGY

## 2024-10-14 PROCEDURE — 99214 OFFICE O/P EST MOD 30 MIN: CPT | Mod: 25,S$GLB,, | Performed by: NURSE PRACTITIONER

## 2024-10-14 PROCEDURE — 93005 ELECTROCARDIOGRAM TRACING: CPT | Mod: S$GLB,,, | Performed by: NURSE PRACTITIONER

## 2024-10-14 PROCEDURE — 99999 PR PBB SHADOW E&M-EST. PATIENT-LVL V: CPT | Mod: PBBFAC,,, | Performed by: NURSE PRACTITIONER

## 2024-10-14 PROCEDURE — 93010 ELECTROCARDIOGRAM REPORT: CPT | Mod: S$GLB,,, | Performed by: INTERNAL MEDICINE

## 2024-10-14 PROCEDURE — 90656 IIV3 VACC NO PRSV 0.5 ML IM: CPT | Mod: S$GLB,,, | Performed by: NURSE PRACTITIONER

## 2024-10-14 PROCEDURE — 71046 X-RAY EXAM CHEST 2 VIEWS: CPT | Mod: TC,PO

## 2024-10-14 NOTE — PROGRESS NOTES
"Subjective:       Patient ID: Kofi Damian is a 44 y.o. male.    Chief Complaint: Chest Pain (Pt reports chest pain and SOB that comes and goes for several weeks. Pt states he has not been seen for this and does not currently have the pain )  CHIEF COMPLAINT:  Mr. Damian presents today with chest pain, shortness of breath, and palpitations.    HPI      CARDIOVASCULAR:  He reports left-sided pain and occasional chest pain for an extended period, describing it as a "massive cramp" occurring more frequently in recent weeks. The pain extends from his side into his chest and sometimes into his abdomen. He notes a tender spot on his rib that is sensitive to touch. He experiences shortness of breath over the last couple of weeks, currently present during the visit. He also describes occasional episodes of chest discomfort in the upper chest area, which have been increasing in frequency. Heart palpitations described as a flutter sensation, occurring a few times daily, lasting for 2-3 beats, accompanied by a feeling of breathlessness. These episodes resolve with a deep breath and happen randomly throughout the day, regardless of activity level or time of day. He denies any specific triggers or exacerbating factors for these episodes.    MEDICAL HISTORY:  He had a positive KATHLEEN test in June and has been experiencing joint pain and swelling, particularly in his hands. He had COVID-19 in February 2023, which was his last significant illness. The joint pain is a long-standing issue he has had for an extended period.    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 He plans to implement dietary modifications and incorporate home-based physical activities. He intends to eliminate sugar from his diet completely, including giving up sweet tea. He currently uses artificial sweeteners in water and tea as alternatives.    SOCIAL HISTORY:  He " successfully quit smoking, which gives him confidence in his ability to make other lifestyle changes, such as reducing sugar intake.        Review of Systems    Vitals:    10/14/24 0751   BP: 138/89   Pulse: 67   Resp: 18   Temp: 98 °F (36.7 °C)       Objective:     Current Outpatient Medications   Medication Sig Dispense Refill    benazepriL (LOTENSIN) 10 MG tablet Take 1 tablet (10 mg total) by mouth once daily. 90 tablet 3    clobetasoL (TEMOVATE) 0.05 % external solution Apply to scalp twice a day x 2 weeks until improved, then D/C. Restart as needed. 50 mL 3    clotrimazole-betamethasone 1-0.05% (LOTRISONE) cream Apply topically 2 (two) times daily. Use 1 g per dose bid 90 g 1    hydrocortisone 2.5 % cream Apply topically 2 (two) times daily. Apply twice daily to affected area until improved, then can stop and restart as needed 28 g 2    ketoconazole (NIZORAL) 2 % cream Apply twice a day to affected areas of skin 60 g 2    ketoconazole (NIZORAL) 2 % shampoo Apply 1 application  topically 2 (two) times daily.      ketoconazole (NIZORAL) 2 % shampoo Apply topically twice a week. Apply 2-3 times a week. Lather, let sit 3-5 minutes, then rinse. 120 mL 5    mometasone (ELOCON) 0.1 % ointment Apply topically 2 (two) times a day. For 2 weeks for  flares to the face and ears. 45 g 1    tapinarof (VTAMA) 1 % Crea Apply 1 Application topically Daily. 60 g 3    tiZANidine (ZANAFLEX) 4 MG tablet TAKE 1 TABLET BY MOUTH EVERY EVENING AS NEEDED FOR MUSCLE SPASMS 90 tablet 2     No current facility-administered medications for this visit.       Physical Exam  Vitals and nursing note reviewed.   Constitutional:       General: He is not in acute distress.     Appearance: He is well-developed.   HENT:      Head: Normocephalic and atraumatic.      Mouth/Throat:      Mouth: Mucous membranes are moist.   Eyes:      Pupils: Pupils are equal, round, and reactive to light.   Cardiovascular:      Rate and Rhythm: Regular rhythm.    Pulmonary:      Effort: Pulmonary effort is normal. No respiratory distress.      Breath sounds: Normal breath sounds.   Musculoskeletal:         General: Normal range of motion.      Cervical back: Normal range of motion and neck supple.   Skin:     General: Skin is warm and dry.      Findings: No rash.   Neurological:      Mental Status: He is alert and oriented to person, place, and time.   Psychiatric:         Thought Content: Thought content normal.         Judgment: Judgment normal.         EKG shows SA, HR 64    Assessment:       1. Other chest pain    2. Palpitations    3. Immunization due        Plan:   DIABETES MANAGEMENT:  - Discussed benefits of natural sugars (e.g., agave nectar, honey) over processed sugars due to different glycemic index and metabolism.  - Explained carbohydrate counting, emphasizing subtraction of fiber g from total carbohydrates.  - Mr. Damian to proceed with planned dietary changes, focusing on reducing processed sugar intake.  - Recommend incorporating natural sweeteners like agave nectar or honey as alternatives to processed sugar.    JOINT PAIN:  - Discussed potential benefits of reducing processed foods for joint pain and overall health based on anecdotal evidence.    PHYSICAL ACTIVITY:  - Mr. Damian to continue efforts to increase physical activity and reduce sedentary time.    HYDRATION:  - Mr. Damian to increase water intake.    DIAGNOSTIC TESTS:  - EKG ordered.  - Chest XR ordered.  - Blood work ordered including complete blood count, chemistries, and thyroid function tests.  -     EKG 12-lead  -     CBC Auto Differential; Future; Expected date: 10/14/2024  -     Comprehensive Metabolic Panel; Future; Expected date: 10/14/2024  -     TSH; Future; Expected date: 10/14/2024  -     X-Ray Chest PA And Lateral; Future; Expected date: 10/14/2024      FLU VACCINATION:  - Flu shot administered in office.  -     influenza (Flulaval, Fluzone, Fluarix) 45 mcg/0.5 mL IM vaccine (> or =  6 mo) 0.5 mL    RHEUMATOLOGY:  - Mr. Damian to share current symptoms and concerns with Dr. Cruz at upcoming rheumatology appointment.        This note was generated with the assistance of ambient listening technology. Verbal consent was obtained by the patient and accompanying visitor(s) for the recording of patient appointment to facilitate this note. I attest to having reviewed and edited the generated note for accuracy, though some syntax or spelling errors may persist. Please contact the author of this note for any clarification.    Follow up if symptoms worsen or fail to improve, for Routine scheduled appointment.    Patient Instructions   Hi Kofi,     If you are due for any health screening(s) below please notify me so we can arrange them to be ordered and scheduled. Most healthy patients at your age complete them, but you are free to accept or refuse.     If you can't do it, I'll definitely understand. If you can, I'd certainly appreciate it!    All of your core healthy metrics are met.

## 2024-10-16 DIAGNOSIS — I10 ESSENTIAL HYPERTENSION: ICD-10-CM

## 2024-10-16 RX ORDER — BENAZEPRIL HYDROCHLORIDE 10 MG/1
10 TABLET ORAL
Qty: 90 TABLET | Refills: 0 | Status: SHIPPED | OUTPATIENT
Start: 2024-10-16

## 2024-10-16 NOTE — TELEPHONE ENCOUNTER
Provider Staff:  Action required for this patient     Please see care gap opportunities below in Care Due Message.    Thanks!  [facility]     Appointments      Date Provider   Last Visit   9/20/2023 Óscar Kelly MD   Next Visit   Visit date not found Óscar Kelly MD      Refill Decision Note   Kofi Damian  is requesting a refill authorization.  Brief Assessment and Rationale for Refill:  Approve     Medication Therapy Plan:         Comments:     Note composed:2:34 AM 10/16/2024

## 2024-10-16 NOTE — TELEPHONE ENCOUNTER
Care Due:                  Date            Visit Type   Department     Provider  --------------------------------------------------------------------------------                                MYCHART                              ANNUAL                              CHECKUP/PHY  [facility] FAMILY  Last Visit: 09-      [facility]       Óscar Kelly  Next Visit: None Scheduled  None         None Found                                                            Last  Test          Frequency    Reason                     Performed    Due Date  --------------------------------------------------------------------------------    Office Visit  15 months..  benazepriL...............  09- 12-    Health [facility] Embedded Care Due Messages. Reference number: [id number].   10/16/2024 12:08:50 AM CDT

## 2024-11-11 ENCOUNTER — LAB VISIT (OUTPATIENT)
Dept: LAB | Facility: HOSPITAL | Age: 44
End: 2024-11-11
Attending: INTERNAL MEDICINE
Payer: COMMERCIAL

## 2024-11-11 DIAGNOSIS — Z79.60 LONG-TERM USE OF IMMUNOSUPPRESSANT MEDICATION: ICD-10-CM

## 2024-11-11 DIAGNOSIS — L40.50 PSORIATIC ARTHRITIS: ICD-10-CM

## 2024-11-11 LAB
ALBUMIN SERPL BCP-MCNC: 4.5 G/DL (ref 3.5–5.2)
ALP SERPL-CCNC: 74 U/L (ref 40–150)
ALT SERPL W/O P-5'-P-CCNC: 33 U/L (ref 10–44)
AST SERPL-CCNC: 19 U/L (ref 10–40)
BASOPHILS # BLD AUTO: 0.08 K/UL (ref 0–0.2)
BASOPHILS NFR BLD: 0.9 % (ref 0–1.9)
BILIRUB DIRECT SERPL-MCNC: 0.1 MG/DL (ref 0.1–0.3)
BILIRUB SERPL-MCNC: 0.4 MG/DL (ref 0.1–1)
CREAT SERPL-MCNC: 1.2 MG/DL (ref 0.5–1.4)
CRP SERPL-MCNC: 15.4 MG/L (ref 0–8.2)
DIFFERENTIAL METHOD BLD: ABNORMAL
EOSINOPHIL # BLD AUTO: 0.5 K/UL (ref 0–0.5)
EOSINOPHIL NFR BLD: 5.6 % (ref 0–8)
ERYTHROCYTE [DISTWIDTH] IN BLOOD BY AUTOMATED COUNT: 12.4 % (ref 11.5–14.5)
EST. GFR  (NO RACE VARIABLE): >60 ML/MIN/1.73 M^2
HCT VFR BLD AUTO: 50.7 % (ref 40–54)
HGB BLD-MCNC: 15.5 G/DL (ref 14–18)
IMM GRANULOCYTES # BLD AUTO: 0.04 K/UL (ref 0–0.04)
IMM GRANULOCYTES NFR BLD AUTO: 0.5 % (ref 0–0.5)
LYMPHOCYTES # BLD AUTO: 2 K/UL (ref 1–4.8)
LYMPHOCYTES NFR BLD: 24.1 % (ref 18–48)
MCH RBC QN AUTO: 28.1 PG (ref 27–31)
MCHC RBC AUTO-ENTMCNC: 30.6 G/DL (ref 32–36)
MCV RBC AUTO: 92 FL (ref 82–98)
MONOCYTES # BLD AUTO: 0.5 K/UL (ref 0.3–1)
MONOCYTES NFR BLD: 5.6 % (ref 4–15)
NEUTROPHILS # BLD AUTO: 5.4 K/UL (ref 1.8–7.7)
NEUTROPHILS NFR BLD: 63.3 % (ref 38–73)
NRBC BLD-RTO: 0 /100 WBC
PLATELET # BLD AUTO: 263 K/UL (ref 150–450)
PMV BLD AUTO: 11.7 FL (ref 9.2–12.9)
PROT SERPL-MCNC: 7.7 G/DL (ref 6–8.4)
RBC # BLD AUTO: 5.52 M/UL (ref 4.6–6.2)
WBC # BLD AUTO: 8.44 K/UL (ref 3.9–12.7)

## 2024-11-11 PROCEDURE — 82565 ASSAY OF CREATININE: CPT | Performed by: INTERNAL MEDICINE

## 2024-11-11 PROCEDURE — 80076 HEPATIC FUNCTION PANEL: CPT | Performed by: INTERNAL MEDICINE

## 2024-11-11 PROCEDURE — 86140 C-REACTIVE PROTEIN: CPT | Performed by: INTERNAL MEDICINE

## 2024-11-11 PROCEDURE — 36415 COLL VENOUS BLD VENIPUNCTURE: CPT | Mod: PO | Performed by: INTERNAL MEDICINE

## 2024-11-11 PROCEDURE — 85025 COMPLETE CBC W/AUTO DIFF WBC: CPT | Performed by: INTERNAL MEDICINE

## 2025-01-18 DIAGNOSIS — I10 ESSENTIAL HYPERTENSION: ICD-10-CM

## 2025-01-18 NOTE — TELEPHONE ENCOUNTER
Care Due:                  Date            Visit Type   Department     Provider  --------------------------------------------------------------------------------                                MYCHART                              ANNUAL                              CHECKUP/PHY  [facility] FAMILY  Last Visit: 09-      [facility]       Óscar Kelly  Next Visit: None Scheduled  None         None Found                                                            Last  Test          Frequency    Reason                     Performed    Due Date  --------------------------------------------------------------------------------    Office Visit  15 months..  benazepriL...............  09- 12-    Health [facility] Embedded Care Due Messages. Reference number: [id number].   1/18/2025 6:59:16 AM CST

## 2025-01-22 RX ORDER — BENAZEPRIL HYDROCHLORIDE 10 MG/1
10 TABLET ORAL
Qty: 30 TABLET | Refills: 2 | Status: SHIPPED | OUTPATIENT
Start: 2025-01-22

## 2025-01-22 NOTE — TELEPHONE ENCOUNTER
Refill Routing Note   Medication(s) are not appropriate for processing by [facility] for the following reason(s):        Patient not seen by provider within 15 months    [facility] action(s):  Defer   Requires appointment : Yes               Appointments  past 12m or future 3m with PCP    Date Provider   Last Visit   9/20/2023 Óscar Kelly MD   Next Visit   Visit date not found Óscar Kelly MD   ED visits in past 90 days: 0        Note composed:12:04 PM 01/22/2025

## 2025-04-18 DIAGNOSIS — I10 ESSENTIAL HYPERTENSION: ICD-10-CM

## 2025-04-18 NOTE — TELEPHONE ENCOUNTER
Care Due:                  Date            Visit Type   Department     Provider  --------------------------------------------------------------------------------                                MYCHART                              ANNUAL                              CHECKUP/PHY  [facility] FAMILY  Last Visit: 09-      [facility]       Óscar Kelly  Next Visit: None Scheduled  None         None Found                                                            Last  Test          Frequency    Reason                     Performed    Due Date  --------------------------------------------------------------------------------    Office Visit  15 months..  benazepriL...............  09- 12-    Health [facility] Embedded Care Due Messages. Reference number: [id number].   4/18/2025 12:07:06 AM CDT

## 2025-04-21 RX ORDER — BENAZEPRIL HYDROCHLORIDE 10 MG/1
10 TABLET ORAL
Qty: 90 TABLET | Refills: 11 | Status: SHIPPED | OUTPATIENT
Start: 2025-04-21

## 2025-04-21 NOTE — TELEPHONE ENCOUNTER
The patient requested medicine refills and I did refill it x 1 year.  Message the patient to notify of any health maintenance care gaps that need to be arranged.   Health Maintenance Due   Topic Date Due    COVID-19 Vaccine (4 - 2024-25 season) 09/01/2024     BP Readings from Last 3 Encounters:   10/14/24 138/89   06/24/24 130/89   09/20/23 115/76     Lab Results   Component Value Date    HGBA1C 4.9 06/24/2024

## 2025-04-21 NOTE — TELEPHONE ENCOUNTER
Refill Routing Note   Medication(s) are not appropriate for processing by [facility] for the following reason(s):        Patient not seen by provider within 15 months    [facility] action(s):  Defer   Requires appointment : Yes               Appointments  past 12m or future 3m with PCP    Date Provider   Last Visit   9/20/2023 Óscar Kelly MD   Next Visit   Visit date not found Óscar Kelly MD   ED visits in past 90 days: 0        Note composed:8:13 AM 04/21/2025

## 2025-05-07 ENCOUNTER — OFFICE VISIT (OUTPATIENT)
Dept: FAMILY MEDICINE | Facility: CLINIC | Age: 45
End: 2025-05-07
Payer: COMMERCIAL

## 2025-05-07 VITALS
BODY MASS INDEX: 31.1 KG/M2 | SYSTOLIC BLOOD PRESSURE: 118 MMHG | WEIGHT: 242.31 LBS | HEART RATE: 62 BPM | HEIGHT: 74 IN | RESPIRATION RATE: 18 BRPM | DIASTOLIC BLOOD PRESSURE: 82 MMHG

## 2025-05-07 DIAGNOSIS — M51.24 THORACIC DISC HERNIATION: ICD-10-CM

## 2025-05-07 DIAGNOSIS — L40.8 SEBOPSORIASIS: ICD-10-CM

## 2025-05-07 DIAGNOSIS — Z00.00 ANNUAL PHYSICAL EXAM: Primary | ICD-10-CM

## 2025-05-07 DIAGNOSIS — G89.29 CHRONIC MIDLINE THORACIC BACK PAIN: ICD-10-CM

## 2025-05-07 DIAGNOSIS — M54.6 CHRONIC MIDLINE THORACIC BACK PAIN: ICD-10-CM

## 2025-05-07 DIAGNOSIS — I10 ESSENTIAL HYPERTENSION: ICD-10-CM

## 2025-05-07 DIAGNOSIS — L40.50 PSORIATIC ARTHRITIS: ICD-10-CM

## 2025-05-07 PROCEDURE — 99999 PR PBB SHADOW E&M-EST. PATIENT-LVL III: CPT | Mod: PBBFAC,,, | Performed by: FAMILY MEDICINE

## 2025-05-07 RX ORDER — CLOBETASOL PROPIONATE 0.5 MG/ML
SOLUTION TOPICAL
Qty: 50 ML | Refills: 11 | Status: SHIPPED | OUTPATIENT
Start: 2025-05-07

## 2025-05-07 RX ORDER — BENAZEPRIL HYDROCHLORIDE 10 MG/1
10 TABLET ORAL DAILY
Qty: 90 TABLET | Refills: 3 | Status: SHIPPED | OUTPATIENT
Start: 2025-05-07

## 2025-05-07 RX ORDER — TIZANIDINE 4 MG/1
TABLET ORAL
Qty: 90 TABLET | Refills: 3 | Status: SHIPPED | OUTPATIENT
Start: 2025-05-07

## 2025-05-07 NOTE — PROGRESS NOTES
Subjective:      Patient ID: Kofi Damian is a 44 y.o. male.    Chief Complaint: Annual Exam    History of Present Illness    CHIEF COMPLAINT:  Patient presents today for follow up    MUSCULOSKELETAL PAIN:  He reports ongoing back, side, and chest pain. The pain is described as cramping, particularly severe in the abdomen and side regions. Chest pain occurs at rest and is non-exertional in nature, occurring while sitting at home or at work.    PSORIATIC ARTHRITIS:  He is under Dr. Palomino's care for psoriatic arthritis primarily affecting shoulders, toes, and knees. Previous treatment with sulfasalazine was discontinued due to elevated blood pressure, and olmesartan proved ineffective. Currently on Atlib with significant improvement in joint pain.    DERMATOLOGIC:  He has severe scalp psoriasis managed with clindamycin solution and clobetasol. He also reports psoriatic lesions on chest.      ROS:  General: -fever, -chills, -fatigue, -weight gain, -weight loss  Eyes: -vision changes, -redness, -discharge  ENT: -ear pain, -nasal congestion, -sore throat  Cardiovascular: -chest pain, -palpitations, -lower extremity edema, +chest pain at rest  Respiratory: -cough, -shortness of breath  Gastrointestinal: +abdominal pain, -nausea, -vomiting, -diarrhea, -constipation, -blood in stool  Genitourinary: -dysuria, -hematuria, -frequency  Musculoskeletal: +joint pain, -muscle pain, +back pain  Skin: +rash, +lesion  Neurological: -headache, -dizziness, -numbness, -tingling  Psychiatric: -anxiety, -depression, -sleep difficulty         Problem List Items Addressed This Visit       Essential hypertension    Overview   The patient presents with essential hypertension.  The patient is tolerating the medication well and is in excellent compliance.  The patient is experiencing no side effects.  Counseling was offered regarding low salt diets.  The patient has a reduced salt intake.  The patient denies chest pain, palpitations,  "shortness of breath, dyspnea on exertion, left or murmur neck pain, nausea, vomiting, diaphoresis, paroxysmal nocturnal dyspnea, and orthopnea.   /74   Pulse 63   Temp 98 °F (36.7 °C) (Oral)   Ht 6' 2" (1.88 m)   Wt 101.5 kg (223 lb 10.5 oz)   BMI 28.72 kg/m²   He is on lotensin for this now.         Relevant Medications    benazepriL (LOTENSIN) 10 MG tablet    Other Relevant Orders    Comprehensive Metabolic Panel    Lipid Panel    Psoriatic arthritis    Overview   He has psoriatic arthritis and is followed by dr. Laguna.  Current treatment has helped him and he will continue.  He will f/u soon.         Thoracic disc herniation    Overview   He has a thoracic disc herniation and he has been treated medically.  He had been evaluated by Dr. Quiñonez.  Gabapentin was not helping that much and he had dizziness so he has stopped it.  Flexeril causes him to feel hung over and he is using tizanidine for prn use.  Historically he had pain on the right side but it has been spreading now to the left for the last 6 months.  He had an MRI years ago. He has been dieting and he has been working out in the gym.  He states that he has been lifting weights.      MRI Thoracic Spine Without Contrast  Order: [id number]  Status: Final result     Visible to patient: Yes (seen)     Next appt: None     Dx: Thoracic disc herniation     0 Result Notes  Details    Reading Physician Reading Date Result Priority   Lavern Khan MD  [phone]  [phone] 7/11/2017    Lilli Prater MD  [phone] 7/11/2017      Narrative & Impression     Comparison: 10/26/2016, CT liver biopsy on 10/17/2016     Technique: Multiplanar MR imaging of the thoracic spine was performed utilizing sagittal T1, T2, and STIR, and axial T2 pulse sequences.     Findings:     The thoracic spine demonstrates proper alignment. The vertebral bodies show normal signal intensity and height with no indication of acute fracture or pathologic marrow replacement " process. The intervertebral disk spaces also appear well-maintained. There is a left paracentral posterior osteophyte at the level of T7-T8 abutting the left lateral cord, similar to prior exam without spinal canal stenosis or neural foraminal narrowing.  There is a small focal right paracentral disc protrusion at the level of T8-T9 and T12-L1 without spinal canal stenosis or neural foraminal narrowing.     The demonstrated portion of the spinal cord is normal in signal intensity at all levels with no indication of myelomalacia or cord edema.      No intradural signal abnormalities are apparent.      Evaluation of the surrounding soft tissue structures are unremarkable.  IMPRESSION:         Stable left paracentral posterior osteophyte at the level of T7-T8 abutting the left lateral cord without spinal canal stenosis.  ______________________________________      Electronically signed by resident: CAROL GORMAN MD  Date:                                            07/11/17  Time:                                           13:53                 As the supervising and teaching physician, I personally reviewed the images and resident's interpretation and I agree with the findings.                 Electronically signed by: RUSLAN ROLLE MD  Date:                                            07/11/17  Time:                                           14:12               Relevant Medications    tiZANidine (ZANAFLEX) 4 MG tablet     Other Visit Diagnoses         Annual physical exam    -  Primary    Relevant Orders    Comprehensive Metabolic Panel    Lipid Panel      Chronic midline thoracic back pain        Relevant Medications    tiZANidine (ZANAFLEX) 4 MG tablet      Sebopsoriasis        Relevant Medications    clobetasoL (TEMOVATE) 0.05 % external solution            The patient's Health Maintenance was reviewed and the following appears to be due:   Health Maintenance Due   Topic Date Due    COVID-19 Vaccine (4 -  " season) 2024       Past Medical History:  Past Medical History:   Diagnosis Date    Back pain     Facet arthropathy, lumbosacral     Family history of early CAD 8/10/2016    His father  at the age of 48 from an MI    L4-L5 disc bulge      Past Surgical History:   Procedure Laterality Date    COLONOSCOPY N/A 2016    Procedure: COLONOSCOPY;  Surgeon: Óscar Kelly MD;  Location: [facility];  Service: Endoscopy;  Laterality: N/A;    LIPOMA RESECTION      WRIST SURGERY       Review of patient's allergies indicates:   Allergen Reactions    Meperidine Rash and Hives     Makes arms "turn red"    Cymbalta [duloxetine] Other (See Comments)     Made him shake, not sleep and have suicidal thoughts.     Medications Ordered Prior to Encounter[1]  Social History[2]  Family History   Problem Relation Name Age of Onset    Hypertension Mother      Heart disease Father         Review of Systems   Constitutional: Negative.  Negative for chills, diaphoresis and fever.   HENT:  Negative for congestion, hearing loss, mouth sores, postnasal drip and sore throat.    Eyes:  Negative for pain and visual disturbance.   Respiratory:  Negative for cough, chest tightness, shortness of breath and wheezing.    Cardiovascular:  Negative for chest pain.   Gastrointestinal:  Negative for abdominal pain, anal bleeding, blood in stool, constipation, diarrhea, nausea and vomiting.   Genitourinary:  Negative for dysuria and hematuria.   Musculoskeletal:  Positive for arthralgias (these are better now that he is being treated for his psoriasis.) and back pain. Negative for neck pain and neck stiffness.   Skin:  Negative for rash.   Neurological:  Negative for dizziness and weakness.       Objective:   /82 (BP Location: Right arm, Patient Position: Sitting)   Pulse 62   Resp 18   Ht 6' 2" (1.88 m)   Wt 109.9 kg (242 lb 4.8 oz)   BMI 31.11 kg/m²   Physical Exam    General: No acute distress. Well-developed. " Well-nourished.  Eyes: EOMI. Sclerae anicteric.  HENT: Normocephalic. Atraumatic. Nares patent. Moist oral mucosa.  Ears: Bilateral TMs clear. Bilateral EACs clear.  Cardiovascular: Regular rate. Regular rhythm. No murmurs. No rubs. No gallops. Normal S1, S2.  Respiratory: Normal respiratory effort. Clear to auscultation bilaterally. No rales. No rhonchi. No wheezing.  Abdomen: Soft. Non-tender. Non-distended. Normoactive bowel sounds.  Musculoskeletal: No  obvious deformity.  Extremities: No lower extremity edema.  Neurological: Alert & oriented x3. No slurred speech. Normal gait.  Psychiatric: Normal mood. Normal affect. Good insight. Good judgment.  Skin: Warm. Dry. No rash.       Physical Exam  Vitals reviewed.   Constitutional:       General: He is not in acute distress.     Appearance: Normal appearance. He is well-developed. He is not ill-appearing or diaphoretic.   HENT:      Head: Normocephalic and atraumatic.      Right Ear: Tympanic membrane, ear canal and external ear normal.      Left Ear: Tympanic membrane, ear canal and external ear normal.      Nose: Nose normal.      Mouth/Throat:      Pharynx: No oropharyngeal exudate.   Eyes:      General: No scleral icterus.        Right eye: No discharge.         Left eye: No discharge.      Conjunctiva/sclera: Conjunctivae normal.      Pupils: Pupils are equal, round, and reactive to light.   Neck:      Thyroid: No thyromegaly.      Vascular: No JVD.   Cardiovascular:      Rate and Rhythm: Normal rate and regular rhythm.      Heart sounds: Normal heart sounds. No murmur heard.     No friction rub. No gallop.   Pulmonary:      Effort: Pulmonary effort is normal. No respiratory distress.      Breath sounds: Normal breath sounds. No wheezing or rales.   Chest:      Chest wall: No tenderness.   Abdominal:      General: Bowel sounds are normal. There is no distension.      Palpations: Abdomen is soft. There is no mass.      Tenderness: There is no abdominal  tenderness. There is no guarding or rebound.   Musculoskeletal:         General: No tenderness. Normal range of motion.      Cervical back: Normal range of motion and neck supple.   Lymphadenopathy:      Cervical: No cervical adenopathy.   Skin:     General: Skin is warm and dry.   Neurological:      Mental Status: He is alert and oriented to person, place, and time.      Cranial Nerves: No cranial nerve deficit.      Coordination: Coordination normal.       Assessment:     1. Annual physical exam    2. Essential hypertension    3. Thoracic disc herniation    4. Chronic midline thoracic back pain    5. Sebopsoriasis    6. Psoriatic arthritis      Plan:   Assessment & Plan    IMPRESSION:  - Reviewed psoriatic arthritis treatment with Dr. Palomino, noting good response to current immunosuppressant (Atlib) after previous trials with sulfasalazine and olmesartan.  - Considered ongoing back, side, and chest pain in context of known structural thoracic spine abnormality.  - Assessed BP control on current low-dose Benazepril regimen.  - Evaluated need for lipid and electrolyte monitoring, noting absence in recent labs by rheumatologist.    ## PSORIATIC ARTHRITIS:  - Confirmed diagnosis by Dr. Palomino with long-term use of immunosuppressants.  - Added to official list of conditions.  - Continued Atlib for treatment, noting previous trials of sulfasalazine and olmesartan.  - Patient reports joint pain is mostly gone, but still experiencing back, side, and chest pain.    ## PSORIASIS:  - Patient reports scalp psoriasis and chest breakout.  - Injection helps with pain but not effective for skin symptoms.  - Refilled clindamycin solution and prescribed clobetasol for scalp psoriasis.    ## HYPERTENSION:  - Blood pressure is well-controlled on Benazepril 10 mg (low dose).  - No exertional chest pain reported.  - Will continue to track electrolytes for BP management.    ## BACK PAIN:  - Patient reports ongoing back pain, possibly  "related to structural abnormality in thoracic spine.  - Prescribed Zanaflex for muscle relaxation and placed refill on file for future use if needed.    ## CHEST PAIN:  - Patient reports non-exertional chest pain, occurring while sitting at home or work.  - May be related to thoracic spine structural abnormality.    ## FOLLOW-UP AND LABS:  - Scheduled to see Dr. Palomino next Wednesday for psoriatic arthritis.  - Labs ordered for Friday (CMP and lipid profile).  - Patient instructed to skip lunch and only eat breakfast before the Friday afternoon lab test.       I have changed Ryan Damian's benazepriL. I am also having him maintain his clotrimazole-betamethasone 1-0.05%, ketoconazole, mometasone, ketoconazole, hydrocortisone, ketoconazole, VTAMA, tiZANidine, adalimumab, and clobetasoL.  No problem-specific Assessment & Plan notes found for this encounter.      No follow-ups on file.    Kofi Rivera" was seen today for annual exam.    Diagnoses and all orders for this visit:    Annual physical exam  -     Comprehensive Metabolic Panel; Future  -     Lipid Panel; Future    Essential hypertension  -     benazepriL (LOTENSIN) 10 MG tablet; Take 1 tablet (10 mg total) by mouth once daily.  -     Comprehensive Metabolic Panel; Future  -     Lipid Panel; Future    Thoracic disc herniation  -     tiZANidine (ZANAFLEX) 4 MG tablet; TAKE 1 TABLET BY MOUTH EVERY EVENING AS NEEDED FOR MUSCLE SPASMS    Chronic midline thoracic back pain  -     tiZANidine (ZANAFLEX) 4 MG tablet; TAKE 1 TABLET BY MOUTH EVERY EVENING AS NEEDED FOR MUSCLE SPASMS    Sebopsoriasis  -     clobetasoL (TEMOVATE) 0.05 % external solution; Apply to scalp twice a day x 2 weeks until improved, then D/C. Restart as needed.    Psoriatic arthritis      Medications Ordered This Encounter   Medications    benazepriL (LOTENSIN) 10 MG tablet     Sig: Take 1 tablet (10 mg total) by mouth once daily.     Dispense:  90 tablet     Refill:  3     .    clobetasoL (TEMOVATE) " 0.05 % external solution     Sig: Apply to scalp twice a day x 2 weeks until improved, then D/C. Restart as needed.     Dispense:  50 mL     Refill:  11    tiZANidine (ZANAFLEX) 4 MG tablet     Sig: TAKE 1 TABLET BY MOUTH EVERY EVENING AS NEEDED FOR MUSCLE SPASMS     Dispense:  90 tablet     Refill:  3     Keep on file     The patient was instructed to stop the following meds:  Medications Discontinued During This Encounter   Medication Reason    clobetasoL (TEMOVATE) 0.05 % external solution Reorder    tiZANidine (ZANAFLEX) 4 MG tablet Reorder    benazepriL (LOTENSIN) 10 MG tablet Reorder     Orders Placed This Encounter   Procedures    Comprehensive Metabolic Panel     Standing Status:   Future     Expected Date:   5/7/2025     Expiration Date:   5/7/2026     Send normal result to authorizing provider's In Basket if patient is active on MyChart::   No    Lipid Panel     Standing Status:   Future     Expected Date:   5/7/2025     Expiration Date:   5/7/2026     Send normal result to authorizing provider's In Basket if patient is active on MyChart::   Yes       Medication List with Changes/Refills   Current Medications    ADALIMUMAB 40 MG/0.4 ML PNKT    Inject 40 mg into the skin every 14 (fourteen) days.    CLOTRIMAZOLE-BETAMETHASONE 1-0.05% (LOTRISONE) CREAM    Apply topically 2 (two) times daily. Use 1 g per dose bid    HYDROCORTISONE 2.5 % CREAM    Apply topically 2 (two) times daily. Apply twice daily to affected area until improved, then can stop and restart as needed    KETOCONAZOLE (NIZORAL) 2 % CREAM    Apply twice a day to affected areas of skin    KETOCONAZOLE (NIZORAL) 2 % SHAMPOO    Apply 1 application  topically 2 (two) times daily.    KETOCONAZOLE (NIZORAL) 2 % SHAMPOO    Apply topically twice a week. Apply 2-3 times a week. Lather, let sit 3-5 minutes, then rinse.    MOMETASONE (ELOCON) 0.1 % OINTMENT    Apply topically 2 (two) times a day. For 2 weeks for  flares to the face and ears.    TAPINAROF  (VTAMA) 1 % CREA    Apply 1 Application topically Daily.   Changed and/or Refilled Medications    Modified Medication Previous Medication    BENAZEPRIL (LOTENSIN) 10 MG TABLET benazepriL (LOTENSIN) 10 MG tablet       Take 1 tablet (10 mg total) by mouth once daily.    TAKE 1 TABLET BY MOUTH EVERY DAY    CLOBETASOL (TEMOVATE) 0.05 % EXTERNAL SOLUTION clobetasoL (TEMOVATE) 0.05 % external solution       Apply to scalp twice a day x 2 weeks until improved, then D/C. Restart as needed.    Apply to scalp twice a day x 2 weeks until improved, then D/C. Restart as needed.    TIZANIDINE (ZANAFLEX) 4 MG TABLET tiZANidine (ZANAFLEX) 4 MG tablet       TAKE 1 TABLET BY MOUTH EVERY EVENING AS NEEDED FOR MUSCLE SPASMS    TAKE 1 TABLET BY MOUTH EVERY EVENING AS NEEDED FOR MUSCLE SPASMS      Medication List with Changes/Refills   Current Medications    ADALIMUMAB 40 MG/0.4 ML PNKT    Inject 40 mg into the skin every 14 (fourteen) days.       Start Date: 2/19/2025 End Date: --    CLOTRIMAZOLE-BETAMETHASONE 1-0.05% (LOTRISONE) CREAM    Apply topically 2 (two) times daily. Use 1 g per dose bid       Start Date: 9/20/2023 End Date: --    HYDROCORTISONE 2.5 % CREAM    Apply topically 2 (two) times daily. Apply twice daily to affected area until improved, then can stop and restart as needed       Start Date: 12/6/2023 End Date: --    KETOCONAZOLE (NIZORAL) 2 % CREAM    Apply twice a day to affected areas of skin       Start Date: 12/6/2023 End Date: --    KETOCONAZOLE (NIZORAL) 2 % SHAMPOO    Apply 1 application  topically 2 (two) times daily.       Start Date: 11/9/2023 End Date: --    KETOCONAZOLE (NIZORAL) 2 % SHAMPOO    Apply topically twice a week. Apply 2-3 times a week. Lather, let sit 3-5 minutes, then rinse.       Start Date: 12/11/2023End Date: --    MOMETASONE (ELOCON) 0.1 % OINTMENT    Apply topically 2 (two) times a day. For 2 weeks for  flares to the face and ears.       Start Date: 12/6/2023 End Date: --    TAPINAROF  (VTAMA) 1 % CREA    Apply 1 Application topically Daily.       Start Date: 1/10/2024 End Date: --   Changed and/or Refilled Medications    Modified Medication Previous Medication    BENAZEPRIL (LOTENSIN) 10 MG TABLET benazepriL (LOTENSIN) 10 MG tablet       Take 1 tablet (10 mg total) by mouth once daily.    TAKE 1 TABLET BY MOUTH EVERY DAY       Start Date: 5/7/2025  End Date: --    Start Date: 4/21/2025 End Date: 5/7/2025    CLOBETASOL (TEMOVATE) 0.05 % EXTERNAL SOLUTION clobetasoL (TEMOVATE) 0.05 % external solution       Apply to scalp twice a day x 2 weeks until improved, then D/C. Restart as needed.    Apply to scalp twice a day x 2 weeks until improved, then D/C. Restart as needed.       Start Date: 5/7/2025  End Date: --    Start Date: 12/20/2023End Date: 5/7/2025    TIZANIDINE (ZANAFLEX) 4 MG TABLET tiZANidine (ZANAFLEX) 4 MG tablet       TAKE 1 TABLET BY MOUTH EVERY EVENING AS NEEDED FOR MUSCLE SPASMS    TAKE 1 TABLET BY MOUTH EVERY EVENING AS NEEDED FOR MUSCLE SPASMS       Start Date: 5/7/2025  End Date: --    Start Date: 8/30/2024 End Date: 5/7/2025                This note was generated with the assistance of ambient listening technology. Verbal consent was obtained by the patient and accompanying visitor(s) for the recording of patient appointment to facilitate this note. I attest to having reviewed and edited the generated note for accuracy, though some syntax or spelling errors may persist. Please contact the author of this note for any clarification.           [1]   Current Outpatient Medications on File Prior to Visit   Medication Sig Dispense Refill    adalimumab 40 mg/0.4 mL PnKt Inject 40 mg into the skin every 14 (fourteen) days.      clotrimazole-betamethasone 1-0.05% (LOTRISONE) cream Apply topically 2 (two) times daily. Use 1 g per dose bid 90 g 1    hydrocortisone 2.5 % cream Apply topically 2 (two) times daily. Apply twice daily to affected area until improved, then can stop and restart as  needed 28 g 2    ketoconazole (NIZORAL) 2 % cream Apply twice a day to affected areas of skin 60 g 2    ketoconazole (NIZORAL) 2 % shampoo Apply 1 application  topically 2 (two) times daily.      ketoconazole (NIZORAL) 2 % shampoo Apply topically twice a week. Apply 2-3 times a week. Lather, let sit 3-5 minutes, then rinse. 120 mL 5    mometasone (ELOCON) 0.1 % ointment Apply topically 2 (two) times a day. For 2 weeks for  flares to the face and ears. 45 g 1    tapinarof (VTAMA) 1 % Crea Apply 1 Application topically Daily. 60 g 3    [DISCONTINUED] benazepriL (LOTENSIN) 10 MG tablet TAKE 1 TABLET BY MOUTH EVERY DAY 90 tablet 11    [DISCONTINUED] clobetasoL (TEMOVATE) 0.05 % external solution Apply to scalp twice a day x 2 weeks until improved, then D/C. Restart as needed. 50 mL 3    [DISCONTINUED] tiZANidine (ZANAFLEX) 4 MG tablet TAKE 1 TABLET BY MOUTH EVERY EVENING AS NEEDED FOR MUSCLE SPASMS 90 tablet 2     No current facility-administered medications on file prior to visit.   [2]   Social History  Socioeconomic History    Marital status:    Occupational History     Employer: [employer]   Tobacco Use    Smoking status: Former     Current packs/day: 0.00     Average packs/day: 1 pack/day for 15.0 years (15.0 ttl pk-yrs)     Types: Cigarettes     Start date: 2013     Quit date: 2021     Years since quittin.3    Smokeless tobacco: Former   Substance and Sexual Activity    Alcohol use: Yes     Comment: stopped 2016    Drug use: No    Sexual activity: Yes     Partners: Female     Social Drivers of Health     Financial Resource Strain: Low Risk  (2024)    Overall Financial Resource Strain (CARDIA)     Difficulty of Paying Living Expenses: Not hard at all   Food Insecurity: No Food Insecurity (2024)    Hunger Vital Sign     Worried About Running Out of Food in the Last Year: Never true     Ran Out of Food in the Last Year: Never true   Transportation Needs: No Transportation Needs  (9/13/2023)    PRAPARE - Transportation     Lack of Transportation (Medical): No     Lack of Transportation (Non-Medical): No   Physical Activity: Insufficiently Active (7/4/2024)    Exercise Vital Sign     Days of Exercise per Week: 1 day     Minutes of Exercise per Session: 60 min   Stress: No Stress Concern Present (7/4/2024)    [employer] - Occupational Stress Questionnaire     Feeling of Stress : Only a little   Housing Stability: Low Risk  (9/13/2023)    Housing Stability Vital Sign     Unable to Pay for Housing in the Last Year: No     Number of Places Lived in the Last Year: 1     Unstable Housing in the Last Year: No

## 2025-05-12 ENCOUNTER — LAB VISIT (OUTPATIENT)
Dept: LAB | Facility: HOSPITAL | Age: 45
End: 2025-05-12
Attending: FAMILY MEDICINE
Payer: COMMERCIAL

## 2025-05-12 DIAGNOSIS — I10 ESSENTIAL HYPERTENSION: ICD-10-CM

## 2025-05-12 DIAGNOSIS — Z00.00 ANNUAL PHYSICAL EXAM: ICD-10-CM

## 2025-05-12 LAB
ALBUMIN SERPL BCP-MCNC: 4.3 G/DL (ref 3.5–5.2)
ALP SERPL-CCNC: 72 UNIT/L (ref 40–150)
ALT SERPL W/O P-5'-P-CCNC: 36 UNIT/L (ref 10–44)
ANION GAP (OHS): 9 MMOL/L (ref 8–16)
AST SERPL-CCNC: 19 UNIT/L (ref 11–45)
BILIRUB SERPL-MCNC: 0.6 MG/DL (ref 0.1–1)
BUN SERPL-MCNC: 16 MG/DL (ref 6–20)
CALCIUM SERPL-MCNC: 9.3 MG/DL (ref 8.7–10.5)
CHLORIDE SERPL-SCNC: 106 MMOL/L (ref 95–110)
CHOLEST SERPL-MCNC: 155 MG/DL (ref 120–199)
CHOLEST/HDLC SERPL: 4.2 {RATIO} (ref 2–5)
CO2 SERPL-SCNC: 26 MMOL/L (ref 23–29)
CREAT SERPL-MCNC: 0.9 MG/DL (ref 0.5–1.4)
GFR SERPLBLD CREATININE-BSD FMLA CKD-EPI: >60 ML/MIN/1.73/M2
GLUCOSE SERPL-MCNC: 87 MG/DL (ref 70–110)
HDLC SERPL-MCNC: 37 MG/DL (ref 40–75)
HDLC SERPL: 23.9 % (ref 20–50)
LDLC SERPL CALC-MCNC: 102.2 MG/DL (ref 63–159)
NONHDLC SERPL-MCNC: 118 MG/DL
POTASSIUM SERPL-SCNC: 4.7 MMOL/L (ref 3.5–5.1)
PROT SERPL-MCNC: 7.5 GM/DL (ref 6–8.4)
SODIUM SERPL-SCNC: 141 MMOL/L (ref 136–145)
TRIGL SERPL-MCNC: 79 MG/DL (ref 30–150)

## 2025-05-12 PROCEDURE — 80061 LIPID PANEL: CPT

## 2025-05-12 PROCEDURE — 36415 COLL VENOUS BLD VENIPUNCTURE: CPT | Mod: PO

## 2025-05-12 PROCEDURE — 80053 COMPREHEN METABOLIC PANEL: CPT

## 2025-05-13 ENCOUNTER — RESULTS FOLLOW-UP (OUTPATIENT)
Dept: FAMILY MEDICINE | Facility: CLINIC | Age: 45
End: 2025-05-13